# Patient Record
Sex: FEMALE | Race: WHITE | Employment: OTHER | URBAN - METROPOLITAN AREA
[De-identification: names, ages, dates, MRNs, and addresses within clinical notes are randomized per-mention and may not be internally consistent; named-entity substitution may affect disease eponyms.]

---

## 2017-07-11 PROBLEM — E11.8 CONTROLLED TYPE 2 DIABETES MELLITUS WITH COMPLICATION, WITHOUT LONG-TERM CURRENT USE OF INSULIN (HCC): Status: ACTIVE | Noted: 2017-07-11

## 2018-04-10 ENCOUNTER — NURSE ONLY (OUTPATIENT)
Dept: CARDIOLOGY CLINIC | Age: 68
End: 2018-04-10
Payer: MEDICARE

## 2018-04-10 ENCOUNTER — OFFICE VISIT (OUTPATIENT)
Dept: CARDIOLOGY CLINIC | Age: 68
End: 2018-04-10
Payer: MEDICARE

## 2018-04-10 VITALS
HEIGHT: 65 IN | WEIGHT: 181 LBS | SYSTOLIC BLOOD PRESSURE: 104 MMHG | HEART RATE: 92 BPM | DIASTOLIC BLOOD PRESSURE: 80 MMHG | BODY MASS INDEX: 30.16 KG/M2

## 2018-04-10 DIAGNOSIS — E11.8 CONTROLLED TYPE 2 DIABETES MELLITUS WITH COMPLICATION, WITHOUT LONG-TERM CURRENT USE OF INSULIN (HCC): ICD-10-CM

## 2018-04-10 DIAGNOSIS — Z95.0 STATUS POST PLACEMENT OF CARDIAC PACEMAKER: ICD-10-CM

## 2018-04-10 DIAGNOSIS — E78.5 DYSLIPIDEMIA: ICD-10-CM

## 2018-04-10 DIAGNOSIS — R00.1 BRADYCARDIA: Primary | ICD-10-CM

## 2018-04-10 DIAGNOSIS — Z95.0 STATUS POST PLACEMENT OF CARDIAC PACEMAKER: Primary | ICD-10-CM

## 2018-04-10 DIAGNOSIS — E66.9 NON MORBID OBESITY: ICD-10-CM

## 2018-04-10 PROCEDURE — G8427 DOCREV CUR MEDS BY ELIG CLIN: HCPCS | Performed by: INTERNAL MEDICINE

## 2018-04-10 PROCEDURE — 3017F COLORECTAL CA SCREEN DOC REV: CPT | Performed by: INTERNAL MEDICINE

## 2018-04-10 PROCEDURE — 93280 PM DEVICE PROGR EVAL DUAL: CPT | Performed by: INTERNAL MEDICINE

## 2018-04-10 PROCEDURE — 1090F PRES/ABSN URINE INCON ASSESS: CPT | Performed by: INTERNAL MEDICINE

## 2018-04-10 PROCEDURE — G8399 PT W/DXA RESULTS DOCUMENT: HCPCS | Performed by: INTERNAL MEDICINE

## 2018-04-10 PROCEDURE — 99214 OFFICE O/P EST MOD 30 MIN: CPT | Performed by: INTERNAL MEDICINE

## 2018-04-10 PROCEDURE — 1036F TOBACCO NON-USER: CPT | Performed by: INTERNAL MEDICINE

## 2018-04-10 PROCEDURE — 1123F ACP DISCUSS/DSCN MKR DOCD: CPT | Performed by: INTERNAL MEDICINE

## 2018-04-10 PROCEDURE — G8417 CALC BMI ABV UP PARAM F/U: HCPCS | Performed by: INTERNAL MEDICINE

## 2018-04-10 PROCEDURE — 4040F PNEUMOC VAC/ADMIN/RCVD: CPT | Performed by: INTERNAL MEDICINE

## 2018-04-10 PROCEDURE — 2022F DILAT RTA XM EVC RTNOPTHY: CPT | Performed by: INTERNAL MEDICINE

## 2018-04-10 PROCEDURE — 3046F HEMOGLOBIN A1C LEVEL >9.0%: CPT | Performed by: INTERNAL MEDICINE

## 2018-04-10 RX ORDER — PIOGLITAZONEHYDROCHLORIDE 15 MG/1
TABLET ORAL
Refills: 0 | COMMUNITY
Start: 2018-03-20 | End: 2018-04-10

## 2018-05-07 ENCOUNTER — HOSPITAL ENCOUNTER (OUTPATIENT)
Dept: MAMMOGRAPHY | Age: 68
Discharge: HOME | End: 2018-05-09
Payer: MEDICARE

## 2018-05-07 DIAGNOSIS — Z12.39 BREAST SCREENING: ICD-10-CM

## 2018-05-07 PROCEDURE — 77063 BREAST TOMOSYNTHESIS BI: CPT

## 2018-07-16 RX ORDER — SIMVASTATIN 10 MG
10 TABLET ORAL NIGHTLY
Qty: 90 TABLET | Refills: 3 | Status: SHIPPED | OUTPATIENT
Start: 2018-07-16 | End: 2018-07-18 | Stop reason: SDUPTHER

## 2018-07-16 RX ORDER — FUROSEMIDE 20 MG/1
20 TABLET ORAL DAILY
Qty: 90 TABLET | Refills: 3 | Status: SHIPPED | OUTPATIENT
Start: 2018-07-16 | End: 2018-07-18 | Stop reason: SDUPTHER

## 2018-07-18 RX ORDER — SIMVASTATIN 10 MG
10 TABLET ORAL NIGHTLY
Qty: 90 TABLET | Refills: 3 | Status: SHIPPED | OUTPATIENT
Start: 2018-07-18 | End: 2019-06-24 | Stop reason: SDUPTHER

## 2018-07-18 RX ORDER — FUROSEMIDE 20 MG/1
20 TABLET ORAL DAILY
Qty: 90 TABLET | Refills: 3 | Status: SHIPPED | OUTPATIENT
Start: 2018-07-18 | End: 2019-06-24 | Stop reason: SDUPTHER

## 2018-10-26 ENCOUNTER — NURSE ONLY (OUTPATIENT)
Dept: CARDIOLOGY CLINIC | Age: 68
End: 2018-10-26
Payer: MEDICARE

## 2018-10-26 ENCOUNTER — OFFICE VISIT (OUTPATIENT)
Dept: CARDIOLOGY CLINIC | Age: 68
End: 2018-10-26
Payer: MEDICARE

## 2018-10-26 VITALS
HEIGHT: 64 IN | DIASTOLIC BLOOD PRESSURE: 80 MMHG | BODY MASS INDEX: 31.07 KG/M2 | SYSTOLIC BLOOD PRESSURE: 110 MMHG | WEIGHT: 182 LBS | HEART RATE: 80 BPM

## 2018-10-26 DIAGNOSIS — E66.9 NON MORBID OBESITY: ICD-10-CM

## 2018-10-26 DIAGNOSIS — Z95.0 STATUS POST PLACEMENT OF CARDIAC PACEMAKER: ICD-10-CM

## 2018-10-26 DIAGNOSIS — Z95.0 PACEMAKER: Primary | ICD-10-CM

## 2018-10-26 DIAGNOSIS — E78.5 DYSLIPIDEMIA: ICD-10-CM

## 2018-10-26 DIAGNOSIS — E11.8 CONTROLLED TYPE 2 DIABETES MELLITUS WITH COMPLICATION, WITHOUT LONG-TERM CURRENT USE OF INSULIN (HCC): ICD-10-CM

## 2018-10-26 DIAGNOSIS — R00.1 BRADYCARDIA: Primary | ICD-10-CM

## 2018-10-26 PROCEDURE — 1101F PT FALLS ASSESS-DOCD LE1/YR: CPT | Performed by: INTERNAL MEDICINE

## 2018-10-26 PROCEDURE — G8399 PT W/DXA RESULTS DOCUMENT: HCPCS | Performed by: INTERNAL MEDICINE

## 2018-10-26 PROCEDURE — 3046F HEMOGLOBIN A1C LEVEL >9.0%: CPT | Performed by: INTERNAL MEDICINE

## 2018-10-26 PROCEDURE — 2022F DILAT RTA XM EVC RTNOPTHY: CPT | Performed by: INTERNAL MEDICINE

## 2018-10-26 PROCEDURE — 99214 OFFICE O/P EST MOD 30 MIN: CPT | Performed by: INTERNAL MEDICINE

## 2018-10-26 PROCEDURE — G8427 DOCREV CUR MEDS BY ELIG CLIN: HCPCS | Performed by: INTERNAL MEDICINE

## 2018-10-26 PROCEDURE — 1123F ACP DISCUSS/DSCN MKR DOCD: CPT | Performed by: INTERNAL MEDICINE

## 2018-10-26 PROCEDURE — 1090F PRES/ABSN URINE INCON ASSESS: CPT | Performed by: INTERNAL MEDICINE

## 2018-10-26 PROCEDURE — 4040F PNEUMOC VAC/ADMIN/RCVD: CPT | Performed by: INTERNAL MEDICINE

## 2018-10-26 PROCEDURE — 1036F TOBACCO NON-USER: CPT | Performed by: INTERNAL MEDICINE

## 2018-10-26 PROCEDURE — G8417 CALC BMI ABV UP PARAM F/U: HCPCS | Performed by: INTERNAL MEDICINE

## 2018-10-26 PROCEDURE — 93280 PM DEVICE PROGR EVAL DUAL: CPT | Performed by: INTERNAL MEDICINE

## 2018-10-26 PROCEDURE — G8484 FLU IMMUNIZE NO ADMIN: HCPCS | Performed by: INTERNAL MEDICINE

## 2018-10-26 PROCEDURE — 3017F COLORECTAL CA SCREEN DOC REV: CPT | Performed by: INTERNAL MEDICINE

## 2018-10-26 RX ORDER — PIOGLITAZONEHYDROCHLORIDE 15 MG/1
TABLET ORAL
Refills: 1 | COMMUNITY
Start: 2018-10-20 | End: 2021-11-09

## 2018-11-07 ENCOUNTER — HOSPITAL ENCOUNTER (OUTPATIENT)
Dept: CARDIAC CATH/INVASIVE PROCEDURES | Age: 68
Discharge: HOME | End: 2018-11-07
Payer: MEDICARE

## 2018-11-07 PROCEDURE — 76000 FLUOROSCOPY <1 HR PHYS/QHP: CPT | Performed by: INTERNAL MEDICINE

## 2019-01-22 ENCOUNTER — NURSE ONLY (OUTPATIENT)
Dept: CARDIOLOGY CLINIC | Age: 69
End: 2019-01-22
Payer: MEDICARE

## 2019-01-22 DIAGNOSIS — Z95.0 PACEMAKER: Primary | ICD-10-CM

## 2019-01-22 PROCEDURE — 93280 PM DEVICE PROGR EVAL DUAL: CPT | Performed by: INTERNAL MEDICINE

## 2019-04-05 ENCOUNTER — NURSE ONLY (OUTPATIENT)
Dept: CARDIOLOGY CLINIC | Age: 69
End: 2019-04-05
Payer: MEDICARE

## 2019-04-05 ENCOUNTER — OFFICE VISIT (OUTPATIENT)
Dept: CARDIOLOGY CLINIC | Age: 69
End: 2019-04-05
Payer: MEDICARE

## 2019-04-05 VITALS
SYSTOLIC BLOOD PRESSURE: 120 MMHG | DIASTOLIC BLOOD PRESSURE: 78 MMHG | HEIGHT: 64 IN | WEIGHT: 188 LBS | HEART RATE: 96 BPM | BODY MASS INDEX: 32.1 KG/M2

## 2019-04-05 DIAGNOSIS — Z90.12 S/P MASTECTOMY, LEFT: ICD-10-CM

## 2019-04-05 DIAGNOSIS — R00.1 BRADYCARDIA: Primary | ICD-10-CM

## 2019-04-05 DIAGNOSIS — E11.8 CONTROLLED TYPE 2 DIABETES MELLITUS WITH COMPLICATION, WITHOUT LONG-TERM CURRENT USE OF INSULIN (HCC): ICD-10-CM

## 2019-04-05 DIAGNOSIS — Z95.0 PACEMAKER: Primary | ICD-10-CM

## 2019-04-05 DIAGNOSIS — E66.9 NON MORBID OBESITY: ICD-10-CM

## 2019-04-05 DIAGNOSIS — Z95.0 STATUS POST PLACEMENT OF CARDIAC PACEMAKER: ICD-10-CM

## 2019-04-05 DIAGNOSIS — E78.5 DYSLIPIDEMIA: ICD-10-CM

## 2019-04-05 PROCEDURE — 93280 PM DEVICE PROGR EVAL DUAL: CPT | Performed by: INTERNAL MEDICINE

## 2019-04-05 PROCEDURE — 1123F ACP DISCUSS/DSCN MKR DOCD: CPT | Performed by: INTERNAL MEDICINE

## 2019-04-05 PROCEDURE — 93000 ELECTROCARDIOGRAM COMPLETE: CPT | Performed by: INTERNAL MEDICINE

## 2019-04-05 PROCEDURE — 1090F PRES/ABSN URINE INCON ASSESS: CPT | Performed by: INTERNAL MEDICINE

## 2019-04-05 PROCEDURE — G8427 DOCREV CUR MEDS BY ELIG CLIN: HCPCS | Performed by: INTERNAL MEDICINE

## 2019-04-05 PROCEDURE — G8417 CALC BMI ABV UP PARAM F/U: HCPCS | Performed by: INTERNAL MEDICINE

## 2019-04-05 PROCEDURE — 3046F HEMOGLOBIN A1C LEVEL >9.0%: CPT | Performed by: INTERNAL MEDICINE

## 2019-04-05 PROCEDURE — 3017F COLORECTAL CA SCREEN DOC REV: CPT | Performed by: INTERNAL MEDICINE

## 2019-04-05 PROCEDURE — 4040F PNEUMOC VAC/ADMIN/RCVD: CPT | Performed by: INTERNAL MEDICINE

## 2019-04-05 PROCEDURE — 2022F DILAT RTA XM EVC RTNOPTHY: CPT | Performed by: INTERNAL MEDICINE

## 2019-04-05 PROCEDURE — 99214 OFFICE O/P EST MOD 30 MIN: CPT | Performed by: INTERNAL MEDICINE

## 2019-04-05 PROCEDURE — G8399 PT W/DXA RESULTS DOCUMENT: HCPCS | Performed by: INTERNAL MEDICINE

## 2019-04-05 PROCEDURE — 1036F TOBACCO NON-USER: CPT | Performed by: INTERNAL MEDICINE

## 2019-04-05 NOTE — PROGRESS NOTES
 CARDIAC PACEMAKER PLACEMENT      battery depletion of permanent pacemaker with replacement    CARDIAC PACEMAKER PLACEMENT  2000    [facility] Pacesetter    CYST REMOVAL      cysts removed from hand, hip and foot    DIAGNOSTIC CARDIAC CATH LAB PROCEDURE  1990    [facility] normal coronaries, normal cath per PAW    DIAGNOSTIC CARDIAC CATH LAB PROCEDURE      mild dilated cardiomyopathy    FOOT SURGERY  2004    broken right foot with removal of hematoma by dr Kulwinder Solares, [facility]  2006    left mastectomy for cancer    TONSILLECTOMY          History reviewed. No pertinent family history. Social History     Tobacco Use    Smoking status: Former Smoker     Packs/day: 0.50     Years: 20.00     Pack years: 10.00     Last attempt to quit: 2001     Years since quittin.2    Smokeless tobacco: Never Used    Tobacco comment: quit in    Substance Use Topics    Alcohol use: No     Comment: daily pop         Review of Systems:  HEENT: negative for acute visual symptoms or auditory problems, no dysphagia  Constitutional: negative for fever and chills, or significant weight loss  Respiratory: negative for cough, wheezing, or hemoptysis  Cardiovascular: negative for chest pain, palpitations, and dyspnea  Gastrointestinal: negative for abdominal pain, diarrhea, nausea and vomiting  Endocrine: Negative for polyuria and polydyspsia  Genitourinary:negative for dysuria and hematuria  Derm: negative for rash and skin lesion(s)  Neurological: negative for tingling, numbness, weakness, seizures and tremors  Endocrine: negative for polydipsia and polyuria  Musculoskeletal: negative for pain or tenderness  Psychiatric: negative for anxiety, depression, or suicidal ideations         O:  COMPLETE PHYSICAL EXAM:       /78   Pulse 96   Ht 5' 4\" (1.626 m)   Wt 188 lb (85.3 kg)   BMI 32.27 kg/m²       General:   Patient alert, comfortable, no distress.   Appears stated profile discussed. Call if any exertional chest pain, short of breath, dizzy or palpitations   Follow up in 6 months or earlier if needed.          [facility] Cardiology  [address], [place], [address] [facility]  [phone]

## 2019-05-16 ENCOUNTER — HOSPITAL ENCOUNTER (OUTPATIENT)
Dept: MAMMOGRAPHY | Age: 69
Discharge: HOME | End: 2019-05-18
Payer: MEDICARE

## 2019-05-16 DIAGNOSIS — Z12.39 BREAST SCREENING: ICD-10-CM

## 2019-05-16 PROCEDURE — 77067 SCR MAMMO BI INCL CAD: CPT

## 2019-05-24 ENCOUNTER — HOSPITAL ENCOUNTER (OUTPATIENT)
Dept: ULTRASOUND IMAGING | Age: 69
End: 2019-05-24
Payer: MEDICARE

## 2019-05-24 ENCOUNTER — HOSPITAL ENCOUNTER (OUTPATIENT)
Dept: MAMMOGRAPHY | Age: 69
Discharge: HOME | End: 2019-05-26
Payer: MEDICARE

## 2019-05-24 DIAGNOSIS — R92.0 BREAST MICROCALCIFICATIONS: ICD-10-CM

## 2019-05-24 PROCEDURE — 77065 DX MAMMO INCL CAD UNI: CPT

## 2019-06-24 RX ORDER — FUROSEMIDE 20 MG/1
20 TABLET ORAL DAILY
Qty: 90 TABLET | Refills: 3 | Status: SHIPPED | OUTPATIENT
Start: 2019-06-24 | End: 2019-06-27 | Stop reason: SDUPTHER

## 2019-06-24 RX ORDER — SIMVASTATIN 10 MG
10 TABLET ORAL NIGHTLY
Qty: 90 TABLET | Refills: 3 | Status: SHIPPED | OUTPATIENT
Start: 2019-06-24 | End: 2019-06-27 | Stop reason: SDUPTHER

## 2019-06-27 RX ORDER — FUROSEMIDE 20 MG/1
20 TABLET ORAL DAILY
Qty: 90 TABLET | Refills: 3 | Status: SHIPPED
Start: 2019-06-27 | End: 2020-06-15

## 2019-06-27 RX ORDER — SIMVASTATIN 10 MG
10 TABLET ORAL NIGHTLY
Qty: 90 TABLET | Refills: 3 | Status: SHIPPED
Start: 2019-06-27 | End: 2020-06-15

## 2019-11-12 ENCOUNTER — OFFICE VISIT (OUTPATIENT)
Dept: CARDIOLOGY CLINIC | Age: 69
End: 2019-11-12
Payer: MEDICARE

## 2019-11-12 VITALS
HEART RATE: 91 BPM | BODY MASS INDEX: 31.58 KG/M2 | DIASTOLIC BLOOD PRESSURE: 72 MMHG | SYSTOLIC BLOOD PRESSURE: 128 MMHG | HEIGHT: 64 IN | WEIGHT: 185 LBS

## 2019-11-12 DIAGNOSIS — Z95.0 STATUS POST PLACEMENT OF CARDIAC PACEMAKER: ICD-10-CM

## 2019-11-12 DIAGNOSIS — E66.9 NON MORBID OBESITY: ICD-10-CM

## 2019-11-12 DIAGNOSIS — E78.5 DYSLIPIDEMIA: ICD-10-CM

## 2019-11-12 DIAGNOSIS — E11.8 CONTROLLED TYPE 2 DIABETES MELLITUS WITH COMPLICATION, WITHOUT LONG-TERM CURRENT USE OF INSULIN (HCC): ICD-10-CM

## 2019-11-12 DIAGNOSIS — R00.1 BRADYCARDIA: Primary | ICD-10-CM

## 2019-11-12 DIAGNOSIS — Z90.12 S/P MASTECTOMY, LEFT: ICD-10-CM

## 2019-11-12 PROCEDURE — 1036F TOBACCO NON-USER: CPT | Performed by: INTERNAL MEDICINE

## 2019-11-12 PROCEDURE — 1123F ACP DISCUSS/DSCN MKR DOCD: CPT | Performed by: INTERNAL MEDICINE

## 2019-11-12 PROCEDURE — 3046F HEMOGLOBIN A1C LEVEL >9.0%: CPT | Performed by: INTERNAL MEDICINE

## 2019-11-12 PROCEDURE — G8484 FLU IMMUNIZE NO ADMIN: HCPCS | Performed by: INTERNAL MEDICINE

## 2019-11-12 PROCEDURE — G8417 CALC BMI ABV UP PARAM F/U: HCPCS | Performed by: INTERNAL MEDICINE

## 2019-11-12 PROCEDURE — G8427 DOCREV CUR MEDS BY ELIG CLIN: HCPCS | Performed by: INTERNAL MEDICINE

## 2019-11-12 PROCEDURE — 3017F COLORECTAL CA SCREEN DOC REV: CPT | Performed by: INTERNAL MEDICINE

## 2019-11-12 PROCEDURE — G8399 PT W/DXA RESULTS DOCUMENT: HCPCS | Performed by: INTERNAL MEDICINE

## 2019-11-12 PROCEDURE — 4040F PNEUMOC VAC/ADMIN/RCVD: CPT | Performed by: INTERNAL MEDICINE

## 2019-11-12 PROCEDURE — 99214 OFFICE O/P EST MOD 30 MIN: CPT | Performed by: INTERNAL MEDICINE

## 2019-11-12 PROCEDURE — 1090F PRES/ABSN URINE INCON ASSESS: CPT | Performed by: INTERNAL MEDICINE

## 2019-11-12 PROCEDURE — 2022F DILAT RTA XM EVC RTNOPTHY: CPT | Performed by: INTERNAL MEDICINE

## 2019-11-15 ENCOUNTER — HOSPITAL ENCOUNTER (OUTPATIENT)
Dept: ULTRASOUND IMAGING | Age: 69
End: 2019-11-15
Payer: MEDICARE

## 2019-11-15 ENCOUNTER — HOSPITAL ENCOUNTER (OUTPATIENT)
Dept: MAMMOGRAPHY | Age: 69
Discharge: HOME | End: 2019-11-17
Payer: MEDICARE

## 2019-11-15 DIAGNOSIS — Z09 FOLLOW-UP EXAM, 3-6 MONTHS SINCE PREVIOUS EXAM: ICD-10-CM

## 2019-11-15 PROCEDURE — G0279 TOMOSYNTHESIS, MAMMO: HCPCS

## 2020-02-14 ENCOUNTER — TELEPHONE (OUTPATIENT)
Dept: CARDIOLOGY CLINIC | Age: 70
End: 2020-02-14

## 2020-02-14 NOTE — TELEPHONE ENCOUNTER
Called patient to schedule J-Wire fluroscopy. Her pacemaker lead. Scheduled fluoroscopy in [facility] 2/28/2020 @ 11:30. Madeline Dias     Discussed with patient  She agreed to date and time    I can't find the order for the procedure. Called [facility], spoke to Joaquín Dave and Ramos Tinajero in [facility].   I was not able to find the order

## 2020-02-18 ENCOUNTER — TELEPHONE (OUTPATIENT)
Dept: CARDIOLOGY CLINIC | Age: 70
End: 2020-02-18

## 2020-02-18 NOTE — TELEPHONE ENCOUNTER
Jorden Her the [facility]  asking me who would do the fluoroscopy of the J-Wire Pacemaker lead. Would one of the EP physicians do this procedure. I've been told  It takes 5 minutes. In the past Dr Sravani Horton did this,  but since he is no longer in [facility] was not sure if EP would since its a pacemaker lead.      Yun Pendleton

## 2020-02-24 ENCOUNTER — TELEPHONE (OUTPATIENT)
Dept: NON INVASIVE DIAGNOSTICS | Age: 70
End: 2020-02-24

## 2020-02-28 ENCOUNTER — HOSPITAL ENCOUNTER (OUTPATIENT)
Dept: CARDIAC CATH/INVASIVE PROCEDURES | Age: 70
Discharge: HOME | End: 2020-02-28
Payer: MEDICARE

## 2020-02-28 PROCEDURE — 76000 FLUOROSCOPY <1 HR PHYS/QHP: CPT

## 2020-02-28 NOTE — H&P
 APPENDECTOMY      BREAST BIOPSY Right 2019    BREAST LUMPECTOMY      3 benign lumps removed from left breast    CARDIAC PACEMAKER PLACEMENT      dr Torres [place] for sick sinus syndrome sinus arrest    CARDIAC PACEMAKER PLACEMENT      battery depletion of permanent pacemaker with replacement    CARDIAC PACEMAKER PLACEMENT      [facility] Pacesetter    CYST REMOVAL      cysts removed from hand, hip and foot    DIAGNOSTIC CARDIAC CATH LAB PROCEDURE  1990    [facility] normal coronaries, normal cath per PAW    DIAGNOSTIC CARDIAC CATH LAB PROCEDURE      mild dilated cardiomyopathy    EYE SURGERY Bilateral 2019    laser Sx (relieve pressure)    FOOT SURGERY  2004    broken right foot with removal of hematoma by dr Evelin Barnett, RADICAL  2006    left mastectomy for cancer    TONSILLECTOMY          No family history on file.        Social History     Tobacco Use    Smoking status: Former Smoker     Packs/day: 0.50     Years: 20.00     Pack years: 10.00     Last attempt to quit: 2001     Years since quittin.1    Smokeless tobacco: Never Used    Tobacco comment: quit in    Substance Use Topics    Alcohol use: No     Comment: daily pop         Review of Systems:  HEENT: negative for acute visual symptoms or auditory problems, no dysphagia  Constitutional: negative for fever and chills, or significant weight loss  Respiratory: negative for cough, wheezing, or hemoptysis  Cardiovascular: negative for chest pain, palpitations, and dyspnea  Gastrointestinal: negative for abdominal pain, diarrhea, nausea and vomiting  Endocrine: Negative for polyuria and polydyspsia  Genitourinary:negative for dysuria and hematuria  Derm: negative for rash and skin lesion(s)  Neurological: negative for tingling, numbness, weakness, seizures and tremors  Endocrine: negative for polydipsia and polyuria  Musculoskeletal: negative for pain or tenderness  Psychiatric: negative

## 2020-03-17 NOTE — OP NOTE
[facility] Cardiac Electrophysiology    Annual fluoroscopy of the patient's pacemaker leads were performed. The atrial and ventricular lead status remains stable.     Clayton Palmer DO  [facility] [place] [facility] Cardiac Electrophysiology  [facility]

## 2020-06-15 RX ORDER — SIMVASTATIN 10 MG
TABLET ORAL
Qty: 90 TABLET | Refills: 3 | Status: SHIPPED
Start: 2020-06-15 | End: 2021-06-01

## 2020-06-15 RX ORDER — FUROSEMIDE 20 MG/1
TABLET ORAL
Qty: 90 TABLET | Refills: 3 | Status: SHIPPED
Start: 2020-06-15 | End: 2021-06-01

## 2020-06-30 ENCOUNTER — NURSE ONLY (OUTPATIENT)
Dept: CARDIOLOGY CLINIC | Age: 70
End: 2020-06-30
Payer: MEDICARE

## 2020-06-30 ENCOUNTER — OFFICE VISIT (OUTPATIENT)
Dept: CARDIOLOGY CLINIC | Age: 70
End: 2020-06-30
Payer: MEDICARE

## 2020-06-30 VITALS
HEIGHT: 64 IN | SYSTOLIC BLOOD PRESSURE: 128 MMHG | BODY MASS INDEX: 33.29 KG/M2 | DIASTOLIC BLOOD PRESSURE: 76 MMHG | WEIGHT: 195 LBS | HEART RATE: 98 BPM

## 2020-06-30 PROCEDURE — 2022F DILAT RTA XM EVC RTNOPTHY: CPT | Performed by: INTERNAL MEDICINE

## 2020-06-30 PROCEDURE — 1123F ACP DISCUSS/DSCN MKR DOCD: CPT | Performed by: INTERNAL MEDICINE

## 2020-06-30 PROCEDURE — 1036F TOBACCO NON-USER: CPT | Performed by: INTERNAL MEDICINE

## 2020-06-30 PROCEDURE — G8427 DOCREV CUR MEDS BY ELIG CLIN: HCPCS | Performed by: INTERNAL MEDICINE

## 2020-06-30 PROCEDURE — G8417 CALC BMI ABV UP PARAM F/U: HCPCS | Performed by: INTERNAL MEDICINE

## 2020-06-30 PROCEDURE — 99214 OFFICE O/P EST MOD 30 MIN: CPT | Performed by: INTERNAL MEDICINE

## 2020-06-30 PROCEDURE — 93280 PM DEVICE PROGR EVAL DUAL: CPT | Performed by: INTERNAL MEDICINE

## 2020-06-30 PROCEDURE — 3046F HEMOGLOBIN A1C LEVEL >9.0%: CPT | Performed by: INTERNAL MEDICINE

## 2020-06-30 PROCEDURE — G8399 PT W/DXA RESULTS DOCUMENT: HCPCS | Performed by: INTERNAL MEDICINE

## 2020-06-30 PROCEDURE — 4040F PNEUMOC VAC/ADMIN/RCVD: CPT | Performed by: INTERNAL MEDICINE

## 2020-06-30 PROCEDURE — 3017F COLORECTAL CA SCREEN DOC REV: CPT | Performed by: INTERNAL MEDICINE

## 2020-06-30 PROCEDURE — 1090F PRES/ABSN URINE INCON ASSESS: CPT | Performed by: INTERNAL MEDICINE

## 2020-09-01 ENCOUNTER — TELEPHONE (OUTPATIENT)
Dept: NON INVASIVE DIAGNOSTICS | Age: 70
End: 2020-09-01

## 2020-09-01 ENCOUNTER — NURSE ONLY (OUTPATIENT)
Dept: CARDIOLOGY CLINIC | Age: 70
End: 2020-09-01

## 2020-09-01 NOTE — TELEPHONE ENCOUNTER
----- Message from Garry Nash MD sent at 9/1/2020 11:48 AM EDT -----  Could you please check with [employer] and see whether we need an extraction of the atrial lead or we can just abandon it. Do we still have to do fluoroscopy every year even though it is abandoned? Thanks.   ----- Message -----  From: Barry Jones RN  Sent: 9/1/2020  10:58 AM EDT  To: Garry Nash MD, Chelsey Bustamante    Hi,   Please review when you have a moment. Dr Brando Sun requesting PGR with new atrial lead within the month. Device triggered FIFI.  KNOWN High Atrial lead impedence with multiple noise reversions; Per Dr Cheryle Salines note New Atrial lead at the time of PGR or sooner; Pt aware; Last annual fluoroscopy of atrial Encore lead with retained stylet per  advisory done 02/28/200 by Dr Yadira Epstein. Last 2D Echo 02/24/12 EF 64%. Not Dependent.   Thanks,   Fahad Tamez

## 2020-09-02 NOTE — TELEPHONE ENCOUNTER
2D Echo scheduled for 09/15/2020 @ 10am at the [facility] office  [address] that someone would be calling her from our [address] to schedule Venessa abad with new Atrial Lead after the echo is complete

## 2020-09-02 NOTE — TELEPHONE ENCOUNTER
I spoke to [facility] and scheduled her gen change w/ new atrial lead on 9/29/20 w/ CK. He will obtain COVID testing at [address] [place] [address] on 9/24/20.

## 2020-09-11 ENCOUNTER — TELEPHONE (OUTPATIENT)
Dept: CARDIOLOGY | Age: 70
End: 2020-09-11

## 2020-09-15 ENCOUNTER — HOSPITAL ENCOUNTER (OUTPATIENT)
Dept: CARDIOLOGY | Age: 70
Discharge: HOME | End: 2020-09-15
Payer: MEDICARE

## 2020-09-15 LAB
LV EF: 58 %
LVEF MODALITY: NORMAL

## 2020-09-15 PROCEDURE — 93306 TTE W/DOPPLER COMPLETE: CPT

## 2020-09-16 ENCOUNTER — TELEPHONE (OUTPATIENT)
Dept: NON INVASIVE DIAGNOSTICS | Age: 70
End: 2020-09-16

## 2020-09-16 NOTE — TELEPHONE ENCOUNTER
----- Message from Wilfrido Caldwell MD sent at 9/15/2020  8:04 PM EDT -----  Please let her know that echo showed normal LV EF.  Thanks.  ----- Message -----  From: Denae Gross Cardiology Results From [facility]  Sent: 9/15/2020   4:59 PM EDT  To: Wilfrido Caldwell MD

## 2020-09-24 ENCOUNTER — HOSPITAL ENCOUNTER (OUTPATIENT)
Age: 70
Discharge: HOME | End: 2020-09-26
Payer: MEDICARE

## 2020-09-24 PROCEDURE — U0003 INFECTIOUS AGENT DETECTION BY NUCLEIC ACID (DNA OR RNA); SEVERE ACUTE RESPIRATORY SYNDROME CORONAVIRUS 2 (SARS-COV-2) (CORONAVIRUS DISEASE [COVID-19]), AMPLIFIED PROBE TECHNIQUE, MAKING USE OF HIGH THROUGHPUT TECHNOLOGIES AS DESCRIBED BY CMS-2020-01-R: HCPCS

## 2020-09-26 LAB
SARS-COV-2: NOT DETECTED
SOURCE: NORMAL

## 2020-09-29 ENCOUNTER — ANESTHESIA (OUTPATIENT)
Dept: CARDIAC CATH/INVASIVE PROCEDURES | Age: 70
End: 2020-09-29

## 2020-09-29 ENCOUNTER — HOSPITAL ENCOUNTER (OUTPATIENT)
Dept: GENERAL RADIOLOGY | Age: 70
Discharge: HOME | DRG: 242 | End: 2020-10-01
Attending: INTERNAL MEDICINE
Payer: MEDICARE

## 2020-09-29 ENCOUNTER — ANESTHESIA EVENT (OUTPATIENT)
Dept: CARDIAC CATH/INVASIVE PROCEDURES | Age: 70
End: 2020-09-29

## 2020-09-29 ENCOUNTER — HOSPITAL ENCOUNTER (INPATIENT)
Dept: CARDIAC CATH/INVASIVE PROCEDURES | Age: 70
LOS: 5 days | Discharge: HOME | DRG: 242 | End: 2020-10-06
Attending: INTERNAL MEDICINE | Admitting: INTERNAL MEDICINE
Payer: MEDICARE

## 2020-09-29 VITALS — OXYGEN SATURATION: 98 % | DIASTOLIC BLOOD PRESSURE: 78 MMHG | SYSTOLIC BLOOD PRESSURE: 93 MMHG

## 2020-09-29 PROBLEM — Z95.0 S/P PLACEMENT OF CARDIAC PACEMAKER: Status: ACTIVE | Noted: 2020-09-29

## 2020-09-29 LAB
ANION GAP SERPL CALCULATED.3IONS-SCNC: 16 MMOL/L (ref 7–16)
BASOPHILS ABSOLUTE: 0.05 E9/L (ref 0–0.2)
BASOPHILS RELATIVE PERCENT: 0.7 % (ref 0–2)
BUN BLDV-MCNC: 11 MG/DL (ref 8–23)
CALCIUM SERPL-MCNC: 9.2 MG/DL (ref 8.6–10.2)
CHLORIDE BLD-SCNC: 102 MMOL/L (ref 98–107)
CO2: 25 MMOL/L (ref 22–29)
CREAT SERPL-MCNC: 0.7 MG/DL (ref 0.5–1)
EKG ATRIAL RATE: 78 BPM
EKG P AXIS: 25 DEGREES
EKG P-R INTERVAL: 188 MS
EKG Q-T INTERVAL: 398 MS
EKG QRS DURATION: 94 MS
EKG QTC CALCULATION (BAZETT): 453 MS
EKG R AXIS: -6 DEGREES
EKG T AXIS: -4 DEGREES
EKG VENTRICULAR RATE: 78 BPM
EOSINOPHILS ABSOLUTE: 0.21 E9/L (ref 0.05–0.5)
EOSINOPHILS RELATIVE PERCENT: 2.8 % (ref 0–6)
GFR AFRICAN AMERICAN: >60
GFR NON-AFRICAN AMERICAN: >60 ML/MIN/1.73
GLUCOSE BLD-MCNC: 111 MG/DL (ref 74–99)
HCT VFR BLD CALC: 44.3 % (ref 34–48)
HEMOGLOBIN: 15 G/DL (ref 11.5–15.5)
IMMATURE GRANULOCYTES #: 0.02 E9/L
IMMATURE GRANULOCYTES %: 0.3 % (ref 0–5)
LYMPHOCYTES ABSOLUTE: 2 E9/L (ref 1.5–4)
LYMPHOCYTES RELATIVE PERCENT: 26.6 % (ref 20–42)
MAGNESIUM: 2 MG/DL (ref 1.6–2.6)
MCH RBC QN AUTO: 30.4 PG (ref 26–35)
MCHC RBC AUTO-ENTMCNC: 33.9 % (ref 32–34.5)
MCV RBC AUTO: 89.7 FL (ref 80–99.9)
MONOCYTES ABSOLUTE: 0.76 E9/L (ref 0.1–0.95)
MONOCYTES RELATIVE PERCENT: 10.1 % (ref 2–12)
NEUTROPHILS ABSOLUTE: 4.47 E9/L (ref 1.8–7.3)
NEUTROPHILS RELATIVE PERCENT: 59.5 % (ref 43–80)
PDW BLD-RTO: 12.6 FL (ref 11.5–15)
PLATELET # BLD: 299 E9/L (ref 130–450)
PMV BLD AUTO: 9.6 FL (ref 7–12)
POTASSIUM SERPL-SCNC: 3.7 MMOL/L (ref 3.5–5)
RBC # BLD: 4.94 E12/L (ref 3.5–5.5)
SODIUM BLD-SCNC: 143 MMOL/L (ref 132–146)
WBC # BLD: 7.5 E9/L (ref 4.5–11.5)

## 2020-09-29 PROCEDURE — 33233 REMOVAL OF PM GENERATOR: CPT | Performed by: INTERNAL MEDICINE

## 2020-09-29 PROCEDURE — 6370000000 HC RX 637 (ALT 250 FOR IP): Performed by: INTERNAL MEDICINE

## 2020-09-29 PROCEDURE — 80048 BASIC METABOLIC PNL TOTAL CA: CPT

## 2020-09-29 PROCEDURE — G0378 HOSPITAL OBSERVATION PER HR: HCPCS

## 2020-09-29 PROCEDURE — G0379 DIRECT REFER HOSPITAL OBSERV: HCPCS

## 2020-09-29 PROCEDURE — 33206 INSERT HEART PM ATRIAL: CPT | Performed by: INTERNAL MEDICINE

## 2020-09-29 PROCEDURE — 93010 ELECTROCARDIOGRAM REPORT: CPT | Performed by: INTERNAL MEDICINE

## 2020-09-29 PROCEDURE — 2580000003 HC RX 258: Performed by: INTERNAL MEDICINE

## 2020-09-29 PROCEDURE — 99223 1ST HOSP IP/OBS HIGH 75: CPT | Performed by: INTERNAL MEDICINE

## 2020-09-29 PROCEDURE — 2709999900 HC NON-CHARGEABLE SUPPLY

## 2020-09-29 PROCEDURE — 3E0102A INTRODUCTION OF ANTI-INFECTIVE ENVELOPE INTO SUBCUTANEOUS TISSUE, OPEN APPROACH: ICD-10-PCS | Performed by: INTERNAL MEDICINE

## 2020-09-29 PROCEDURE — 0JH606Z INSERTION OF PACEMAKER, DUAL CHAMBER INTO CHEST SUBCUTANEOUS TISSUE AND FASCIA, OPEN APPROACH: ICD-10-PCS | Performed by: INTERNAL MEDICINE

## 2020-09-29 PROCEDURE — 2580000003 HC RX 258

## 2020-09-29 PROCEDURE — 36415 COLL VENOUS BLD VENIPUNCTURE: CPT

## 2020-09-29 PROCEDURE — 3700000000 HC ANESTHESIA ATTENDED CARE

## 2020-09-29 PROCEDURE — C1894 INTRO/SHEATH, NON-LASER: HCPCS

## 2020-09-29 PROCEDURE — 2780000010 HC IMPLANT OTHER

## 2020-09-29 PROCEDURE — 6360000002 HC RX W HCPCS: Performed by: NURSE ANESTHETIST, CERTIFIED REGISTERED

## 2020-09-29 PROCEDURE — C1769 GUIDE WIRE: HCPCS

## 2020-09-29 PROCEDURE — 93005 ELECTROCARDIOGRAM TRACING: CPT | Performed by: INTERNAL MEDICINE

## 2020-09-29 PROCEDURE — 6360000002 HC RX W HCPCS

## 2020-09-29 PROCEDURE — 02H63JZ INSERTION OF PACEMAKER LEAD INTO RIGHT ATRIUM, PERCUTANEOUS APPROACH: ICD-10-PCS | Performed by: INTERNAL MEDICINE

## 2020-09-29 PROCEDURE — 02PA3MZ REMOVAL OF CARDIAC LEAD FROM HEART, PERCUTANEOUS APPROACH: ICD-10-PCS | Performed by: INTERNAL MEDICINE

## 2020-09-29 PROCEDURE — 83735 ASSAY OF MAGNESIUM: CPT

## 2020-09-29 PROCEDURE — 0JPT0PZ REMOVAL OF CARDIAC RHYTHM RELATED DEVICE FROM TRUNK SUBCUTANEOUS TISSUE AND FASCIA, OPEN APPROACH: ICD-10-PCS | Performed by: INTERNAL MEDICINE

## 2020-09-29 PROCEDURE — 85025 COMPLETE CBC W/AUTO DIFF WBC: CPT

## 2020-09-29 PROCEDURE — 71045 X-RAY EXAM CHEST 1 VIEW: CPT

## 2020-09-29 PROCEDURE — C1898 LEAD, PMKR, OTHER THAN TRANS: HCPCS

## 2020-09-29 PROCEDURE — 2720000010 HC SURG SUPPLY STERILE

## 2020-09-29 PROCEDURE — 3700000001 HC ADD 15 MINUTES (ANESTHESIA)

## 2020-09-29 PROCEDURE — 2500000003 HC RX 250 WO HCPCS

## 2020-09-29 PROCEDURE — C1785 PMKR, DUAL, RATE-RESP: HCPCS

## 2020-09-29 RX ORDER — FENTANYL CITRATE 50 UG/ML
INJECTION, SOLUTION INTRAMUSCULAR; INTRAVENOUS PRN
Status: DISCONTINUED | OUTPATIENT
Start: 2020-09-29 | End: 2020-09-29 | Stop reason: SDUPTHER

## 2020-09-29 RX ORDER — PROPOFOL 10 MG/ML
INJECTION, EMULSION INTRAVENOUS PRN
Status: DISCONTINUED | OUTPATIENT
Start: 2020-09-29 | End: 2020-09-29 | Stop reason: SDUPTHER

## 2020-09-29 RX ORDER — SODIUM CHLORIDE 0.9 % (FLUSH) 0.9 %
10 SYRINGE (ML) INJECTION EVERY 12 HOURS SCHEDULED
Status: DISCONTINUED | OUTPATIENT
Start: 2020-09-29 | End: 2020-10-06 | Stop reason: HOSPADM

## 2020-09-29 RX ORDER — M-VIT,TX,IRON,MINS/CALC/FOLIC 27MG-0.4MG
1 TABLET ORAL DAILY
Status: DISCONTINUED | OUTPATIENT
Start: 2020-09-29 | End: 2020-10-06 | Stop reason: HOSPADM

## 2020-09-29 RX ORDER — PIOGLITAZONEHYDROCHLORIDE 15 MG/1
15 TABLET ORAL DAILY
Status: DISCONTINUED | OUTPATIENT
Start: 2020-09-29 | End: 2020-10-06 | Stop reason: HOSPADM

## 2020-09-29 RX ORDER — PROPOFOL 10 MG/ML
INJECTION, EMULSION INTRAVENOUS CONTINUOUS PRN
Status: DISCONTINUED | OUTPATIENT
Start: 2020-09-29 | End: 2020-09-29 | Stop reason: SDUPTHER

## 2020-09-29 RX ORDER — SIMVASTATIN 10 MG
10 TABLET ORAL NIGHTLY
Status: DISCONTINUED | OUTPATIENT
Start: 2020-09-29 | End: 2020-10-06 | Stop reason: HOSPADM

## 2020-09-29 RX ORDER — FENTANYL CITRATE 50 UG/ML
25 INJECTION, SOLUTION INTRAMUSCULAR; INTRAVENOUS
Status: DISCONTINUED | OUTPATIENT
Start: 2020-09-29 | End: 2020-09-30

## 2020-09-29 RX ORDER — ASPIRIN 81 MG/1
81 TABLET, CHEWABLE ORAL DAILY
Status: DISCONTINUED | OUTPATIENT
Start: 2020-09-29 | End: 2020-10-06 | Stop reason: HOSPADM

## 2020-09-29 RX ORDER — LORATADINE 10 MG/1
10 TABLET ORAL DAILY
Status: DISCONTINUED | OUTPATIENT
Start: 2020-09-29 | End: 2020-10-06 | Stop reason: HOSPADM

## 2020-09-29 RX ORDER — SODIUM CHLORIDE 0.9 % (FLUSH) 0.9 %
10 SYRINGE (ML) INJECTION PRN
Status: DISCONTINUED | OUTPATIENT
Start: 2020-09-29 | End: 2020-10-06 | Stop reason: HOSPADM

## 2020-09-29 RX ORDER — ACETAMINOPHEN 325 MG/1
650 TABLET ORAL EVERY 4 HOURS PRN
Status: DISCONTINUED | OUTPATIENT
Start: 2020-09-29 | End: 2020-10-06 | Stop reason: HOSPADM

## 2020-09-29 RX ORDER — MIDAZOLAM HYDROCHLORIDE 1 MG/ML
INJECTION INTRAMUSCULAR; INTRAVENOUS PRN
Status: DISCONTINUED | OUTPATIENT
Start: 2020-09-29 | End: 2020-09-29 | Stop reason: SDUPTHER

## 2020-09-29 RX ORDER — CEFAZOLIN SODIUM 1 G/3ML
INJECTION, POWDER, FOR SOLUTION INTRAMUSCULAR; INTRAVENOUS PRN
Status: DISCONTINUED | OUTPATIENT
Start: 2020-09-29 | End: 2020-09-29 | Stop reason: SDUPTHER

## 2020-09-29 RX ORDER — SODIUM CHLORIDE 9 MG/ML
INJECTION, SOLUTION INTRAVENOUS CONTINUOUS
Status: DISCONTINUED | OUTPATIENT
Start: 2020-09-29 | End: 2020-10-04

## 2020-09-29 RX ORDER — FUROSEMIDE 20 MG/1
20 TABLET ORAL DAILY
Status: DISCONTINUED | OUTPATIENT
Start: 2020-09-29 | End: 2020-10-06 | Stop reason: HOSPADM

## 2020-09-29 RX ORDER — LORAZEPAM 1 MG/1
1 TABLET ORAL 3 TIMES DAILY
Status: DISCONTINUED | OUTPATIENT
Start: 2020-09-29 | End: 2020-10-06 | Stop reason: HOSPADM

## 2020-09-29 RX ADMIN — Medication 10 ML: at 22:41

## 2020-09-29 RX ADMIN — LORAZEPAM 1 MG: 1 TABLET ORAL at 22:41

## 2020-09-29 RX ADMIN — SODIUM CHLORIDE: 9 INJECTION, SOLUTION INTRAVENOUS at 11:05

## 2020-09-29 RX ADMIN — PROPOFOL 50 MG: 10 INJECTION, EMULSION INTRAVENOUS at 11:36

## 2020-09-29 RX ADMIN — SIMVASTATIN 10 MG: 10 TABLET, FILM COATED ORAL at 22:41

## 2020-09-29 RX ADMIN — MULTIPLE VITAMINS W/ MINERALS TAB 1 TABLET: TAB at 15:20

## 2020-09-29 RX ADMIN — LORATADINE 10 MG: 10 TABLET ORAL at 15:20

## 2020-09-29 RX ADMIN — SODIUM CHLORIDE: 9 INJECTION, SOLUTION INTRAVENOUS at 08:08

## 2020-09-29 RX ADMIN — CEFAZOLIN 2000 MG: 1 INJECTION, POWDER, FOR SOLUTION INTRAMUSCULAR; INTRAVENOUS at 11:29

## 2020-09-29 RX ADMIN — ASPIRIN 81 MG CHEWABLE TABLET 81 MG: 81 TABLET CHEWABLE at 15:20

## 2020-09-29 RX ADMIN — FENTANYL CITRATE 50 MCG: 50 INJECTION, SOLUTION INTRAMUSCULAR; INTRAVENOUS at 11:25

## 2020-09-29 RX ADMIN — PIOGLITAZONE 15 MG: 15 TABLET ORAL at 15:20

## 2020-09-29 RX ADMIN — FUROSEMIDE 20 MG: 20 TABLET ORAL at 15:20

## 2020-09-29 RX ADMIN — FENTANYL CITRATE 50 MCG: 50 INJECTION, SOLUTION INTRAMUSCULAR; INTRAVENOUS at 11:20

## 2020-09-29 RX ADMIN — MIDAZOLAM 2 MG: 1 INJECTION INTRAMUSCULAR; INTRAVENOUS at 11:20

## 2020-09-29 RX ADMIN — PROPOFOL 30 MCG/KG/MIN: 10 INJECTION, EMULSION INTRAVENOUS at 11:28

## 2020-09-29 RX ADMIN — LORAZEPAM 1 MG: 1 TABLET ORAL at 15:20

## 2020-09-29 RX ADMIN — ACETAMINOPHEN 650 MG: 325 TABLET, FILM COATED ORAL at 23:54

## 2020-09-29 RX ADMIN — PROPOFOL 25 MG: 10 INJECTION, EMULSION INTRAVENOUS at 11:32

## 2020-09-29 ASSESSMENT — PULMONARY FUNCTION TESTS
PIF_VALUE: 13
PIF_VALUE: 12
PIF_VALUE: 12
PIF_VALUE: 11
PIF_VALUE: 13
PIF_VALUE: 13
PIF_VALUE: 12
PIF_VALUE: 13
PIF_VALUE: 1
PIF_VALUE: 13
PIF_VALUE: 12
PIF_VALUE: 11
PIF_VALUE: 11
PIF_VALUE: 18
PIF_VALUE: 12
PIF_VALUE: 20
PIF_VALUE: 14
PIF_VALUE: 11
PIF_VALUE: 12
PIF_VALUE: 11
PIF_VALUE: 1
PIF_VALUE: 11
PIF_VALUE: 12
PIF_VALUE: 1
PIF_VALUE: 12
PIF_VALUE: 12
PIF_VALUE: 11
PIF_VALUE: 1
PIF_VALUE: 12
PIF_VALUE: 13
PIF_VALUE: 18
PIF_VALUE: 11
PIF_VALUE: 14
PIF_VALUE: 12
PIF_VALUE: 13
PIF_VALUE: 1
PIF_VALUE: 20
PIF_VALUE: 11
PIF_VALUE: 12
PIF_VALUE: 1
PIF_VALUE: 12
PIF_VALUE: 13
PIF_VALUE: 12
PIF_VALUE: 11
PIF_VALUE: 13
PIF_VALUE: 13
PIF_VALUE: 1
PIF_VALUE: 12
PIF_VALUE: 12
PIF_VALUE: 1
PIF_VALUE: 1
PIF_VALUE: 12
PIF_VALUE: 11
PIF_VALUE: 12
PIF_VALUE: 20
PIF_VALUE: 12
PIF_VALUE: 12
PIF_VALUE: 1
PIF_VALUE: 11
PIF_VALUE: 20
PIF_VALUE: 12
PIF_VALUE: 1
PIF_VALUE: 12
PIF_VALUE: 1
PIF_VALUE: 11
PIF_VALUE: 13
PIF_VALUE: 12
PIF_VALUE: 11
PIF_VALUE: 12
PIF_VALUE: 12
PIF_VALUE: 16
PIF_VALUE: 12
PIF_VALUE: 11
PIF_VALUE: 13
PIF_VALUE: 11
PIF_VALUE: 12
PIF_VALUE: 13
PIF_VALUE: 12
PIF_VALUE: 12
PIF_VALUE: 13
PIF_VALUE: 11
PIF_VALUE: 1
PIF_VALUE: 12
PIF_VALUE: 12
PIF_VALUE: 1
PIF_VALUE: 11
PIF_VALUE: 13
PIF_VALUE: 12
PIF_VALUE: 13
PIF_VALUE: 12
PIF_VALUE: 12
PIF_VALUE: 11
PIF_VALUE: 13

## 2020-09-29 ASSESSMENT — PAIN SCALES - GENERAL
PAINLEVEL_OUTOF10: 0
PAINLEVEL_OUTOF10: 0
PAINLEVEL_OUTOF10: 4

## 2020-09-29 ASSESSMENT — LIFESTYLE VARIABLES: SMOKING_STATUS: 0

## 2020-09-29 ASSESSMENT — PAIN DESCRIPTION - PAIN TYPE: TYPE: ACUTE PAIN

## 2020-09-29 NOTE — ANESTHESIA PRE PROCEDURE
Reactions    Cefuroxime Axetil     Codeine     Haldol [Haloperidol]     Rocephin [Ceftriaxone Sodium-Lidocaine]        Problem List:    Patient Active Problem List   Diagnosis Code    H/O blood clots Z86.718    Sick sinus syndrome ([facility] I49.5    Sinus arrest I45.5    Heart murmur R01.1    Status post placement of cardiac pacemaker Z95.0    Dyslipidemia E78.5    Controlled type 2 diabetes mellitus with complication, without long-term current use of insulin ([facility]) E11.8       Past Medical History:        Diagnosis Date    Asthma     Bleeding ulcer     diagnosed at age 17    Diabetes mellitus ([facility]     H/O blood clots may 1994    around pacemaker site, shoulder and arm    Sick sinus syndrome ([facility]     Sinus arrest 1994    requiring insertion of permanent pacemaker       Past Surgical History:        Procedure Laterality Date    APPENDECTOMY      BREAST BIOPSY Right 2019    BREAST LUMPECTOMY      3 benign lumps removed from left breast   Lizette Mona Garcia for sick sinus syndrome sinus arrest    CARDIAC PACEMAKER PLACEMENT      battery depletion of permanent pacemaker with replacement    CARDIAC PACEMAKER PLACEMENT      [facility] Pacesetter    CYST REMOVAL      cysts removed from hand, hip and foot    DIAGNOSTIC CARDIAC CATH LAB PROCEDURE  1990    [facility] normal coronaries, normal cath per PAW    DIAGNOSTIC CARDIAC CATH LAB PROCEDURE      mild dilated cardiomyopathy    EYE SURGERY Bilateral 2019    laser Sx (relieve pressure)    FOOT SURGERY  2004    broken right foot with removal of hematoma by dr Ashia Polk, RADICAL  2006    left mastectomy for cancer    TONSILLECTOMY         Social History:    Social History     Tobacco Use    Smoking status: Former Smoker     Packs/day: 0.50     Years: 20.00     Pack years: 10.00     Last attempt to quit: 2001     Years since quittin.7    Smokeless tobacco: Never Used    Tobacco comment: quit in 2001   Substance Use Topics    Alcohol use: No     Comment: daily pop                                Counseling given: Not Answered  Comment: quit in 2001      Vital Signs (Current):   Vitals:    09/29/20 0758   BP: (!) 151/114   Pulse: 106   Resp: (!) 97   Temp: 36.1 °C (97 °F)   TempSrc: Tympanic   SpO2: 95%   Weight: 190 lb (86.2 kg)   Height: 5' 5\" (1.651 m)                                              BP Readings from Last 3 Encounters:   09/29/20 (!) 151/114   06/30/20 128/76   11/12/19 128/72       NPO Status:                           >8 hrs                                                      BMI:   Wt Readings from Last 3 Encounters:   09/29/20 190 lb (86.2 kg)   06/30/20 195 lb (88.5 kg)   11/12/19 185 lb (83.9 kg)     Body mass index is 31.62 kg/m². CBC:   Lab Results   Component Value Date    WBC 7.5 09/29/2020    RBC 4.94 09/29/2020    HGB 15.0 09/29/2020    HCT 44.3 09/29/2020    MCV 89.7 09/29/2020    RDW 12.6 09/29/2020     09/29/2020       CMP:   Lab Results   Component Value Date     09/29/2020    K 3.7 09/29/2020     09/29/2020    CO2 25 09/29/2020    BUN 11 09/29/2020    CREATININE 0.7 09/29/2020    GFRAA >60 09/29/2020    LABGLOM >60 09/29/2020    GLUCOSE 111 09/29/2020    GLUCOSE 111 03/08/2012    PROT 7.1 08/27/2012    CALCIUM 9.2 09/29/2020    BILITOT 1.1 08/27/2012    ALKPHOS 59 08/27/2012    AST 24 08/27/2012    ALT 31 08/27/2012       POC Tests: No results for input(s): POCGLU, POCNA, POCK, POCCL, POCBUN, POCHEMO, POCHCT in the last 72 hours.     Coags: No results found for: PROTIME, INR, APTT    HCG (If Applicable): No results found for: PREGTESTUR, PREGSERUM, HCG, HCGQUANT     ABGs: No results found for: PHART, PO2ART, GVR5BBT, CJI2ZYF, BEART, G4MOCFEM     Type & Screen (If Applicable):  No results found for: LABABO, LABRH    Drug/Infectious Status (If Applicable):  No results found for: HIV, HEPCAB    COVID-19 Screening (If Applicable):   Lab Results   Component Value Date    COVID19 Not Detected 09/24/2020         Anesthesia Evaluation  Patient summary reviewed and Nursing notes reviewed no history of anesthetic complications:   Airway: Mallampati: II  TM distance: >3 FB   Neck ROM: full  Mouth opening: > = 3 FB Dental:    (+) upper dentures, lower dentures and edentulous  Comment: Dentures remain in    Pulmonary:normal exam  breath sounds clear to auscultation  (+) sleep apnea: on noncompliant,  asthma:     (-) not a current smoker                           Cardiovascular:  Exercise tolerance: good (>4 METS),   (+) pacemaker: pacemaker, dysrhythmias (SSS, sinus arrest):,         Rhythm: regular  Rate: normal      Cleared by cardiology              Neuro/Psych:               GI/Hepatic/Renal:   (+) PUD,           Endo/Other:    (+) DiabetesType II DM, well controlled, , blood dyscrasia: anticoagulation therapy:., malignancy/cancer (breast, s/p L mastectomy). Pt had no PAT visit       Abdominal:       Abdomen: soft. Vascular:           ROS comment: R foot and pacer site hematoma. Anesthesia Plan      MAC     ASA 3       Induction: intravenous. Anesthetic plan and risks discussed with patient. Use of blood products discussed with patient whom. Plan discussed with attending.                   Claudell Janus, APRN - CRNA   9/29/2020

## 2020-09-29 NOTE — OP NOTE
[facility]) Physicians- The Heart and Vascular [address] [place] [address] Electrophysiology  Procedure Report  PATIENT: [id number] [address] RECORD NUMBER: [id number]  DATE OF PROCEDURE:  9/29/2020  ATTENDING ELECTROPHYSIOLOGIST:  Jono Wang MD  REFERRING PHYSICIAN: Dr. Davidson Morel and Dr. Srinivasa Neville  Procedure Performed:  1. Generator Replacement of a  Dual Chamber Permanent Pacemaker ([employer])  2. Placement of New Right Atrial Pacemaker Lead     Indication for Procedure:  1. Pacemaker pulse generator at elective replacement interval  2. Right atrial lead malfunction    Flouroscopy: 7.5 min  Complications: none immediately apparent  EBL: Minimal  Specimens: none  Contrast: 20 cc    FINDINGS:  Implanted device information:  1. New Pulse Generator is an [employer]. Serial # [id number]  Placement: Right pectoral subcutaneous  Date of implant: 9/29/2020  2. Old Pulse Generator is an [employer]. Serial # [id number]  Placement: Right pectoral subcutaneous  Date of explant: 9/29/2020  Date of implant: 5/3/2010  3. New right atrial lead parameters are as follows:  Sullivan  Model # [id number]. Serial # [id number]  Lead position: RA  Date of implant: 9/29/2020  P-waves: >5.0 mV  Pacing threshold: 0.5 V at 0.5 ms. Impedance: 960 ohms. 4. Old right atrial lead parameters are as follows:  [employer]ics  Model # [id number]. Serial # [id number]  Lead position: RA  Date of implant: 2/23/1994  Date of abandonment: 9/29/2020  5. Right ventricular lead parameters are as follows:  [employer]  Model 330-258. Serial # [id number]  Lead position: RV  Date of implant: 2/23/2000  R-waves: 6.1mV  Pacing threshold: 1.5 V at 1.0 ms. Impedance: 550 ohms. 6. Bradycardia parameters:  Mode: DDDR  Base Rate: 60  Max Tracking Rate: 120    DETAILS OF THE OPERATION: The risks, benefits, alternatives of the procedure were explained to patient and family. They consented and agreed to proceed. Written consent was obtained in the chart.  Blood products are not routinely needed for such procedures. The patient was brought to the Electrophysiology lab in a fasting and non-sedated state. The patient had electrocardiographic and hemodynamic monitoring equipment placed. During the case the patient was under the care of an anesthesiologist/CRNA team for sedation and hemodynamic monitoring. A final time out was performed prior to beginning the procedure. The patient was prepped and draped in usual sterile fashion. Twenty mL of 2% lidocaine was used to anesthetize the right pectoral area. Using a #10 blade an incision was made over the patient's previous incision. Using combination of scalpel and plasma-blade, we dissected down to the device pocket. The device and was then removed from the pocket and the leads were disconnected from the header of the device. The RV leads was visually inspected and lead parameters were checked and deemed to adequate. The old right atrial lead was capped and then abandoned in the pocket. Using a modified Seldinger technique and a fluroscopic guidance, a guide wire was placed in the right subclavian vein. Over one of the short J-tipped guide wire, a 6-Fr Safe-sheath was passed. Through this, the right atrial lead was advanced without difficulty to the right atrial appendage using a combination of straight and curved stylets and under fluoroscopic guidance. Mapping of the lead was performed. Lead parameters were deemed to be adequate and lead was then actively fixated in place. The 6-Fr Safe- sheath was then removed. The lead was then sewn to the pre-pectoral fascia using 0 Ethibond sutures, suturing over the suture sleeve. The patient's previous device pocket was then extended inferiorly and medially to approximate the size of the new device. Hemostasis was achieved with electrocautery and confirmed visually. The device pocket was then irrigated with antibiotic vancomycin solution.  The leads were then attached to the appropriate binding posts on the header of the device. The set screws were then tightened with a torque wrench. Tug tests were performed on all leads to insure they are adequately fixated. The device and the leads were then placed within the device pocket inside of a Tyrx absorbable anti-microbial pouch. Lead parameters were then rechecked via the device and deemed to be adequate. Surgiflo was injected into the pocket to ensure hemostasis. The incision was closed with 3 layers of absorbable suture. The deepest of which was a 2-0 interrupted stitches followed by a 2nd layer of 3-0 running stitch performed perpendicular to the deep layer and, lastly, a 4-0 subcuticular stitch. The skin was then prepped with benzoin solution, Steri-Strips, and island Aquacel dressing were then applied. At the end of the case, the patient was hemodynamically stable and under the care of an anesthesiologist, the patient was brought back to the recovery area for post procedural monitoring. ASSESSMENT:  1. Successful generator change of an Abbott dual chamber permanent pacemaker for elective replacement indicator   2. Successful placement of new right atrial pacemaker lead    RECOMMENDATIONS:  1. Stat portable chest x-ray to rule out pneumothorax and check lead placement now and tomorrow morning. 2. EKG to be performed now. 3. Post-procedural monitoring in the recovery area. 4. The patient will be observed overnight on Telemetry. 5. The patient will receive 24-hours of aggie-operative intravenous antibiotics. 6. The patient's device will be interrogated in the morning by a representative of the device . 7. The patient is to follow-up in device clinic within 2 weeks upon discharge. 8. The patient is advised follow all post-operative device and wound care instructions as per the information provided in the pre-operative clinic.     Connie Rodriguez MD  Cardiac Electrophysiology  [facility]) Physicians  The Heart and Vascular [place]  11:24 AM  9/29/2020

## 2020-09-29 NOTE — CARE COORDINATION
Care Coordination:  Met with pt to discuss transition of care upon discharge. Pt has just admitted to unit, observation status for bradycardia. Lives at home with boyfriend, 2 story home, completely independent no hx of monika, hhc or dme. Boyfriend will transport home upon discharge. [facility] [address] in [address] [place] [address] and follows with Dr Leonarda Squires. Does not anticipate any home gong needs.   Pt is currently observation status      Nikolai Lindsey

## 2020-09-29 NOTE — H&P
OFFICE VISIT     PRIMARY CARE PHYSICIAN:      Gulshan Hansen MD       ALLERGIES / SENSITIVITIES:        Allergies   Allergen Reactions    Cefuroxime Axetil     Codeine     Haldol [Haloperidol]     Rocephin [Ceftriaxone Sodium-Lidocaine]           REVIEWED MEDICATIONS:        Current Outpatient Medications:     simvastatin (ZOCOR) 10 MG tablet, TAKE 1 TABLET NIGHTLY, Disp: 90 tablet, Rfl: 3    furosemide (LASIX) 20 MG tablet, TAKE 1 TABLET DAILY, Disp: 90 tablet, Rfl: 3    pioglitazone (ACTOS) 15 MG tablet, TAKE 1 TABLET BY MOUTH EVERY DAY, Disp: , Rfl: 1    Multiple Vitamins-Minerals (THERAPEUTIC MULTIVITAMIN-MINERALS) tablet, Take 1 tablet by mouth daily. , Disp: , Rfl:     loratadine (CLARITIN) 10 MG tablet, Take 10 mg by mouth daily. , Disp: , Rfl:     aspirin 81 MG tablet, Take 81 mg by mouth daily. , Disp: , Rfl:     LORazepam (ATIVAN) 1 MG tablet, Take 1 mg by mouth 3 times daily. , Disp: , Rfl:       S: REASON FOR VISIT:       No chief complaint on file. History of Present Illness:       Office Visit for follow up of bradycardia, pacemaker   78 yr pt with hx of Bradycardia, s/ pacemkasr 1994, dyslipidemia came for f/u visit   No hospitalizations or surgeries since last visit   Patient is compliant with all medications   ' lump\" near lateral malleoli    Guillaume any exertional chest pain or short of breath   No palpitations, dizzy or syncope.    Active at home   No orthopnea   Try to watch diet          Past Medical History:   Diagnosis Date    Asthma     Bleeding ulcer     diagnosed at age 17    Diabetes mellitus ([facility]     H/O blood clots may 1994    around pacemaker site, shoulder and arm    Sick sinus syndrome ([facility]     Sinus arrest february 1994    requiring insertion of permanent pacemaker            Past Surgical History:   Procedure Laterality Date    APPENDECTOMY      BREAST BIOPSY Right 05/2019    BREAST LUMPECTOMY      3 benign lumps removed from left breast    [facility] sick [facility] sinus [facility]    CARDIAC PACEMAKER PLACEMENT      battery depletion of permanent pacemaker with replacement    CARDIAC PACEMAKER PLACEMENT  2000    PGR Pacesetter    CYST REMOVAL      cysts removed from hand, hip and foot    DIAGNOSTIC CARDIAC CATH LAB PROCEDURE  1990    [facility] normal coronaries, normal cath per PAW    DIAGNOSTIC CARDIAC CATH LAB PROCEDURE      mild dilated cardiomyopathy    EYE SURGERY Bilateral 2019    laser Sx (relieve pressure)    FOOT SURGERY  2004    broken right foot with removal of hematoma by dr Nancy Goodman, [facility]  2006    left mastectomy for cancer    TONSILLECTOMY          History reviewed. No pertinent family history.        Social History     Tobacco Use    Smoking status: Former Smoker     Packs/day: 0.50     Years: 20.00     Pack years: 10.00     Last attempt to quit: 2001     Years since quittin.7    Smokeless tobacco: Never Used    Tobacco comment: quit in    Substance Use Topics    Alcohol use: No     Comment: daily pop         Review of Systems:  Constitutional: negative for fever and chills, or significant weight loss  HEENT: negative for acute visual symptoms or auditory problems, no dysphagia  Respiratory: negative for cough, wheezing, or hemoptysis  Cardiovascular: negative for chest pain, palpitations, and dyspnea  Gastrointestinal: negative for abdominal pain, diarrhea, nausea and vomiting  Endocrine: Negative for polyuria and polydyspsia  Genitourinary:negative for dysuria and hematuria  Derm: negative for rash and skin lesion(s)  Neurological: negative for tingling, numbness, weakness, seizures and tremors  Endocrine: negative for polydipsia and polyuria  Musculoskeletal: negative for pain or tenderness  Psychiatric: negative for anxiety, depression, or suicidal ideations         O:  COMPLETE PHYSICAL EXAM:       BP (!) 151/114   Pulse 106   Temp 97 °F (36.1 °C) (Tympanic)   Resp (!) 97   Ht 5' 5\" (1.651 m)   Wt 190 lb (86.2 kg)   SpO2 95%   BMI 31.62 kg/m²       General:   Patient alert, comfortable, no distress. Appears stated age. HEENT:    Pupils equal, no icterus, no nasal drainage, tongue moist.   Neck:              No masses, Thyroid not palpable. No elevated JVD, No carotid bruit. Chest:   Normal configuration, non tender. Pacer site OK   Lungs:   Clear to auscultation bilaterally, few scattered rhonchi. Cardiovascular:  Regular rhythm, 1/6 systolic murmur, No S3, PMI at 5th ICS, no palpable thrills,    Abdomen:  Soft, Non tender, Bowel sounds normal, no pulsatile abdominal aorta, no palpable masses. Extremities:  No edema. Distal pulses palpable. No cyanosis, no clubbing. \" SOFT  swellIng near both lateral malleolus, 1.5\"    Skin:   Good turgor, warm and dry, no cyanosis. Musculoskeletal: No joint swelling or deformity. Neuro:   Cranial nerves grossly intact; No focal neurologic deficit. Psych:   Alert, good mood and effect. REVIEW OF DIAGNOSTIC TESTS:        Electrocardiogram: Reviewed    Pacemaker:  Interrogated, OK - bATTERY 3-6 MONTHS            A/P:   ASSESSMENT / PLAN:    Jenifer Gan was seen today for bradycardia. Diagnoses and all orders for this visit:    Status post placement of cardiac pacemaker, 2/1994- Dr Mercedes at [facility], s/p PGR 2000, s/p PGR 2010- [facility]; High Atrial lead impedence - Monitor A-lead  Impedence; Pt aware;  She needs annual fluoroscopy of atrial Encore lead with retained stylet, per  advisory - New Atrial lead at the time of PGR or sooner;    - 12 lead EKG    Dyslipidemia, on Statin     Sick sinus syndrome ([address] - s/p Pacer   - 12 lead EKG    S/P mastectomy, left     Controlled type 2 diabetes mellitus with complication, without long-term current use of insulin (HCC)     Non morbid obesity: Diet, exercise and weight loss discussed.     Preventive Cardiology: Low cholesterol

## 2020-09-30 ENCOUNTER — APPOINTMENT (OUTPATIENT)
Dept: GENERAL RADIOLOGY | Age: 70
DRG: 242 | End: 2020-09-30
Attending: INTERNAL MEDICINE
Payer: MEDICARE

## 2020-09-30 LAB
ANION GAP SERPL CALCULATED.3IONS-SCNC: 14 MMOL/L (ref 7–16)
BUN BLDV-MCNC: 11 MG/DL (ref 8–23)
CALCIUM SERPL-MCNC: 8.7 MG/DL (ref 8.6–10.2)
CHLORIDE BLD-SCNC: 103 MMOL/L (ref 98–107)
CO2: 23 MMOL/L (ref 22–29)
CREAT SERPL-MCNC: 0.6 MG/DL (ref 0.5–1)
GFR AFRICAN AMERICAN: >60
GFR NON-AFRICAN AMERICAN: >60 ML/MIN/1.73
GLUCOSE BLD-MCNC: 114 MG/DL (ref 74–99)
HCT VFR BLD CALC: 40.6 % (ref 34–48)
HEMOGLOBIN: 13.3 G/DL (ref 11.5–15.5)
MAGNESIUM: 2.1 MG/DL (ref 1.6–2.6)
MCH RBC QN AUTO: 30.2 PG (ref 26–35)
MCHC RBC AUTO-ENTMCNC: 32.8 % (ref 32–34.5)
MCV RBC AUTO: 92.3 FL (ref 80–99.9)
PDW BLD-RTO: 12.8 FL (ref 11.5–15)
PLATELET # BLD: 239 E9/L (ref 130–450)
PMV BLD AUTO: 10.1 FL (ref 7–12)
POTASSIUM SERPL-SCNC: 3.4 MMOL/L (ref 3.5–5)
RBC # BLD: 4.4 E12/L (ref 3.5–5.5)
SODIUM BLD-SCNC: 140 MMOL/L (ref 132–146)
WBC # BLD: 9.5 E9/L (ref 4.5–11.5)

## 2020-09-30 PROCEDURE — 6370000000 HC RX 637 (ALT 250 FOR IP): Performed by: INTERNAL MEDICINE

## 2020-09-30 PROCEDURE — 6360000002 HC RX W HCPCS: Performed by: INTERNAL MEDICINE

## 2020-09-30 PROCEDURE — 85027 COMPLETE CBC AUTOMATED: CPT

## 2020-09-30 PROCEDURE — 96374 THER/PROPH/DIAG INJ IV PUSH: CPT

## 2020-09-30 PROCEDURE — 83735 ASSAY OF MAGNESIUM: CPT

## 2020-09-30 PROCEDURE — 32551 INSERTION OF CHEST TUBE: CPT | Performed by: INTERNAL MEDICINE

## 2020-09-30 PROCEDURE — 71045 X-RAY EXAM CHEST 1 VIEW: CPT

## 2020-09-30 PROCEDURE — 80048 BASIC METABOLIC PNL TOTAL CA: CPT

## 2020-09-30 PROCEDURE — 2500000003 HC RX 250 WO HCPCS

## 2020-09-30 PROCEDURE — 2580000003 HC RX 258: Performed by: INTERNAL MEDICINE

## 2020-09-30 PROCEDURE — 36415 COLL VENOUS BLD VENIPUNCTURE: CPT

## 2020-09-30 PROCEDURE — 99024 POSTOP FOLLOW-UP VISIT: CPT | Performed by: INTERNAL MEDICINE

## 2020-09-30 PROCEDURE — 93280 PM DEVICE PROGR EVAL DUAL: CPT | Performed by: INTERNAL MEDICINE

## 2020-09-30 PROCEDURE — G0378 HOSPITAL OBSERVATION PER HR: HCPCS

## 2020-09-30 PROCEDURE — 0W9930Z DRAINAGE OF RIGHT PLEURAL CAVITY WITH DRAINAGE DEVICE, PERCUTANEOUS APPROACH: ICD-10-PCS | Performed by: INTERNAL MEDICINE

## 2020-09-30 PROCEDURE — 96376 TX/PRO/DX INJ SAME DRUG ADON: CPT

## 2020-09-30 RX ORDER — LIDOCAINE HYDROCHLORIDE 10 MG/ML
5 INJECTION, SOLUTION INFILTRATION; PERINEURAL ONCE
Status: COMPLETED | OUTPATIENT
Start: 2020-09-30 | End: 2020-09-30

## 2020-09-30 RX ORDER — LIDOCAINE HYDROCHLORIDE 10 MG/ML
INJECTION, SOLUTION INFILTRATION; PERINEURAL
Status: COMPLETED
Start: 2020-09-30 | End: 2020-09-30

## 2020-09-30 RX ORDER — FENTANYL CITRATE 50 UG/ML
25 INJECTION, SOLUTION INTRAMUSCULAR; INTRAVENOUS
Status: DISCONTINUED | OUTPATIENT
Start: 2020-09-30 | End: 2020-10-06 | Stop reason: HOSPADM

## 2020-09-30 RX ADMIN — LORAZEPAM 1 MG: 1 TABLET ORAL at 17:32

## 2020-09-30 RX ADMIN — PIOGLITAZONE 15 MG: 15 TABLET ORAL at 10:32

## 2020-09-30 RX ADMIN — Medication 10 ML: at 22:53

## 2020-09-30 RX ADMIN — LORATADINE 10 MG: 10 TABLET ORAL at 10:32

## 2020-09-30 RX ADMIN — LORAZEPAM 1 MG: 1 TABLET ORAL at 10:32

## 2020-09-30 RX ADMIN — ASPIRIN 81 MG CHEWABLE TABLET 81 MG: 81 TABLET CHEWABLE at 10:32

## 2020-09-30 RX ADMIN — FENTANYL CITRATE 25 MCG: 0.05 INJECTION, SOLUTION INTRAMUSCULAR; INTRAVENOUS at 12:17

## 2020-09-30 RX ADMIN — LIDOCAINE HYDROCHLORIDE 5 ML: 10 INJECTION, SOLUTION INFILTRATION; PERINEURAL at 10:34

## 2020-09-30 RX ADMIN — SODIUM CHLORIDE, PRESERVATIVE FREE 10 ML: 5 INJECTION INTRAVENOUS at 17:33

## 2020-09-30 RX ADMIN — SODIUM CHLORIDE: 9 INJECTION, SOLUTION INTRAVENOUS at 17:36

## 2020-09-30 RX ADMIN — MULTIPLE VITAMINS W/ MINERALS TAB 1 TABLET: TAB at 10:32

## 2020-09-30 RX ADMIN — LORAZEPAM 1 MG: 1 TABLET ORAL at 22:52

## 2020-09-30 RX ADMIN — Medication 10 ML: at 10:34

## 2020-09-30 RX ADMIN — FENTANYL CITRATE 25 MCG: 0.05 INJECTION, SOLUTION INTRAMUSCULAR; INTRAVENOUS at 17:33

## 2020-09-30 RX ADMIN — SIMVASTATIN 10 MG: 10 TABLET, FILM COATED ORAL at 22:52

## 2020-09-30 ASSESSMENT — PAIN DESCRIPTION - ORIENTATION: ORIENTATION: RIGHT

## 2020-09-30 ASSESSMENT — PAIN DESCRIPTION - FREQUENCY: FREQUENCY: CONTINUOUS

## 2020-09-30 ASSESSMENT — PAIN SCALES - GENERAL
PAINLEVEL_OUTOF10: 8
PAINLEVEL_OUTOF10: 0
PAINLEVEL_OUTOF10: 2
PAINLEVEL_OUTOF10: 2
PAINLEVEL_OUTOF10: 0
PAINLEVEL_OUTOF10: 7

## 2020-09-30 ASSESSMENT — PAIN DESCRIPTION - PAIN TYPE
TYPE: ACUTE PAIN
TYPE: ACUTE PAIN

## 2020-09-30 ASSESSMENT — PAIN DESCRIPTION - LOCATION: LOCATION: FLANK

## 2020-09-30 ASSESSMENT — PAIN DESCRIPTION - PROGRESSION: CLINICAL_PROGRESSION: GRADUALLY WORSENING

## 2020-09-30 ASSESSMENT — PAIN DESCRIPTION - ONSET: ONSET: ON-GOING

## 2020-09-30 ASSESSMENT — PAIN DESCRIPTION - DESCRIPTORS: DESCRIPTORS: SHOOTING;SHARP

## 2020-09-30 NOTE — PROGRESS NOTES
Cardiac Electrophysiology Inpatient Progress Note    Annie Cranker  1950  Date of Service: 9/30/2020  PCP: MD Reema Bermudez MD  Electrophysiologist: Estephania Roy MD        Subjective: Annie Cranker is seen in hospital follow-up and management of: CRT-P generator replacement and new right atrial pacemaker lead. 09/30/20: Annie Cranker is seen in hospital follow-up she under went generator replacement and placement of a new right atrial lead  With the capping and abandonment of her prior atrial lead. This morning she had decreased breath sounds and was tachycardiac. A chest X ray showed a large right sided pneumothorax with slight mediastinal shift to the left. Dr. Ed Chan was consulted and urgently placed a right sided chest tube/pig tail cathter post placement of the chest tube aidee reported improved respirations. Her device function is normal on post op day 1, her dressing is clean dry and intact the device site is without edema or ecchymosis.      Patient Active Problem List   Diagnosis    H/O blood clots    Sick sinus syndrome (HCC)    Sinus arrest    Heart murmur    Status post placement of cardiac pacemaker    Dyslipidemia    Controlled type 2 diabetes mellitus with complication, without long-term current use of insulin (HCC)    S/P placement of cardiac pacemaker    Elective replacement indicated for cardiac pacemaker battery at end of lifespan         Scheduled Medications:   aspirin  81 mg Oral Daily    LORazepam  1 mg Oral TID    loratadine  10 mg Oral Daily    therapeutic multivitamin-minerals  1 tablet Oral Daily    pioglitazone  15 mg Oral Daily    simvastatin  10 mg Oral Nightly    furosemide  20 mg Oral Daily    sodium chloride flush  10 mL Intravenous 2 times per day       Infusion Medications:   sodium chloride Stopped (09/29/20 1241)       PRN Medications:  fentanNYL, sodium chloride flush, acetaminophen    Allergies   Allergen Reactions  Cefuroxime Axetil     Codeine     Haldol [Haloperidol]     Rocephin [Ceftriaxone Sodium-Lidocaine]        Wt Readings from Last 3 Encounters:   09/29/20 190 lb (86.2 kg)   06/30/20 195 lb (88.5 kg)   11/12/19 185 lb (83.9 kg)     Temp Readings from Last 3 Encounters:   09/30/20 97.6 °F (36.4 °C) (Infrared)   08/17/15 97.7 °F (36.5 °C)   03/12/14 98.2 °F (36.8 °C)     BP Readings from Last 3 Encounters:   09/30/20 (!) 162/82   09/29/20 93/78   06/30/20 128/76     Pulse Readings from Last 3 Encounters:   09/30/20 102   06/30/20 98   11/12/19 91         Intake/Output Summary (Last 24 hours) at 9/30/2020 1247  Last data filed at 9/30/2020 0823  Gross per 24 hour   Intake 1200 ml   Output --   Net 1200 ml       ROS:   Constitutional: Negative for fever, activity change and appetite change. HENT: Negative for epistaxis. Eyes: Negative for diploplia, blurred vision. Respiratory: Negative for cough, chest tightness, shortness of breath and wheezing. Cardiovascular: pertinent positives in HPI  Gastrointestinal: Negative for abdominal pain and blood in stool. All other review of systems are negative     PHYSICAL EXAM:  Vitals:    09/29/20 1431 09/29/20 2230 09/30/20 0249 09/30/20 0936   BP: 119/69 (!) 167/98 125/77 (!) 162/82   Pulse: 82 91 91 102   Resp: 16 16 16 16   Temp: 98 °F (36.7 °C)  98.4 °F (36.9 °C) 97.6 °F (36.4 °C)   TempSrc: Infrared  Infrared Infrared   SpO2: 98% 91% 90% 92%   Weight:       Height:         Constitutional: Well-developed, slight dyspnea   Eyes: conjunctivae normal, no xanthelasma   Ears, Nose, Throat: oral mucosa moist, no cyanosis   CV: no JVD. Regular rate and rhythm. Normal S1S2 and no S3. No murmurs, rubs, or gallops.  PMI is nondisplaced  Lungs:decreased on the right sied, normal respiratory effort without used of accessory muscles  Abdomen: soft, non-tender, bowel sounds present, no masses or hepatomegaly   Musculoskeletal: no digital clubbing, no edema   Skin: warm, no flash     Pertinent Labs:  CBC:   Recent Labs     20  0755 20  0522   WBC 7.5 9.5   HGB 15.0 13.3   HCT 44.3 40.6    239     BMP:  Recent Labs     20  0755 20  0522    140   K 3.7 3.4*    103   CO2 25 23   BUN 11 11   CREATININE 0.7 0.6   CALCIUM 9.2 8.7     ABGs: No results found for: PHART, PO2ART, AUZ8AUN  INR: No results for input(s): INR in the last 72 hours. BNP: No results for input(s): BNP in the last 72 hours. TSH:   Lab Results   Component Value Date    TSH 2.395 2013      Cardiac Injury Profile: No results for input(s): CKTOTAL, CKMB, CKMBINDEX, TROPONINI in the last 72 hours. Lipid Profile:   Lab Results   Component Value Date    TRIG 152 2014    HDL 31 2014    LDLCALC 110 2014    CHOL 171 2014      Hemoglobin A1C: No components found for: HGBA1C   Xray: Xr Chest Portable    Result Date: 2020  Patient MRN: [id number] : 1950 Age:  78 years Gender: Female Order Date: 2020 11:20 AM Exam: XR CHEST PORTABLE Number of Images: 1 view Indication:  Follow-up right pneumothorax after placement of pigtail chest drain. Comparison: 2020, 0811 hours. FINDINGS: There is now a pigtail chest drain present on the right. The right pneumothorax has resolved. The transvenous pacemaker is stable. There are no new abnormal findings. Resolution of right pneumothorax after placement of chest drain. Xr Chest Portable    Result Date: 2020  Patient MRN:  [id number] : 1950 Age: 78 years Gender: Female Order Date:  2020 8:00 AM EXAM: XR CHEST PORTABLE INDICATION:  post  pacemaker post  pacemaker COMPARISON: 2020 FINDINGS:  There is been placement of a right-sided pacer. There is a large right-sided pneumothorax with questionable slight mediastinal shift to the left. Heart size is normal. Left lung is clear.      Large right-sided pneumothorax with suggestion of slight mediastinal shift to the left Pleural Effusion   No evidence of pleural effusion. Aorta   Normal aortic root size and ascending aorta. Miscellaneous   The inferior vena cava diameter is normal with normal respiratory   variation. Conclusions      Summary   Normal left ventricular chamber size. Normal left ventricular systolic function, LVEF is 55-60%. Stage I diastolic dysfunction. Normal right ventricle size and function. Pacer lead in RV. The aortic valve appears mildly sclerotic. No hemodynamically significant aortic stenosis is present. There is trace tricuspid regurgitation, RVSP 24mmHg. Normal aortic root size. No evidence of pericardial effusion. Pericardium appears normal.   No intra cardiac mass or thrombus. No recent comparison study available.       Signature      ----------------------------------------------------------------   Electronically signed by Nik Neal MD(Interpreting   physician) on 09/15/2020 04:59 PM   ----------------------------------------------------------------     M-Mode/2D Measurements & Calculations      LV Diastolic    LV Systolic Dimension: 3.3   AV Cusp Separation: 1.9 cmLA   Dimension: 4.6  cm                           Dimension: 3.4 cmAO Root   cm              LV Volume Diastolic: 40.2 ml Dimension: 3.3 cm   LV FS:28.3 %    LV Volume Systolic: 67.5 ml   LV PW           LV EDV/LV EDV Index: 48.5   Diastolic: 1 cm TC/94 VN/E^2EQ ESV/LV ESV   Septum          Index: 43.2 ml/22ml/ m^2     RV Diastolic Dimension: 2.6   Diastolic: 0.9  EF Calculated: 55.6 %        cm   cm              LV Mass Index: 76 l/min*m^2      LV Mass: 148.1                               LA volume/Index: 37.8 ml   g               LVOT: 2.3 cm                 RA Area: 11.4 cm^2     Doppler Measurements & Calculations      MV Peak E-Wave: 0.53 AV Peak Velocity:     LVOT Peak Velocity: 0.92 m/s   m/s                  1.13 m/s              LVOT Mean Velocity: 0.55 m/s   MV Peak A-Wave: 0.67 AV Peak Gradient:     LVOT Peak Gradient: 3.4   m/s                  5.09 mmHg             mmHgLVOT Mean Gradient: 1.4   MV E/A Ratio: 0.79   AV Mean Velocity:     mmHg   MV Peak Gradient:    0.79 m/s              Estimated RVSP: 21.5 mmHg   2.1 mmHg             AV Mean Gradient: 2.1 Estimated RAP:3 mmHg   MV Mean Gradient:    mmHg   0.9 mmHg             AV VTI: 20.6 cm   MV Mean Velocity:    AV Area               TR Velocity:2.15 m/s   0.49 m/s             (Continuity):3.31     TR Gradient:18.52 mmHg   MV Deceleration      cm^2                  PV Peak Velocity: 0.8 m/s   Time: 217.3 msec                           PV Peak Gradient: 2.59 mmHg   MV P1/2t: 50.5 msec  LVOT VTI: 16.4 cm     PV Mean Velocity: 0.49 m/s   MVA by PHT:4.36 cm^2                       PV Mean Gradient: 1.2 mmHg   MV Area              Estimated PASP: 21.52   (continuity): 4.1    mmHg   cm^2     [email or web address] [id number]%3d%3d      Telemetry9/30/2020:  AS-VS with a rate of 107bpm     ECG 9/30/2020: Normal sinus rhythm rate 78 bpmm QRS 94, QTc 453ms     Device Interrogation ( 09/30/20 )  Make/Model [employer] [facility] [employer] MRI   Mode DDDR 70/130  P wave: >5.0 mV  Impedance: 760 ohms   Threshold: 0.5 V @ 0.5 ms  RV R wave: 8.1 mV  Impedance: 580 ohms   Threshold: 1.25 V @ 1.0 ms  Pacing: A: <1%  RV: <1%  Battery Voltage/Longevity:  4.6-11.4 years     Arrhythmias: none   Reprogramming included none   Overall device function is normal  All device programmable settings were evaluated per above and in the scanned document, along with iterative adjustments (capture thresholds) to assess and select the most appropriate final programming to provide for consistent delivery of the appropriate therapy and to verify function of the device. I have independently reviewed all of the ECGs and rhythm strips per above.     I have personally reviewed the laboratory, cardiac diagnostic and radiographic testing as outlined above. I have reviewed previous records noted in [address]. Impression:    1. Pacemaker in situ  - [address] at [facility]  - right sided   - DOI 2/1994   - PGR 2010   - Retained atrial stylet with recommend yearly fluoroscopy per  advisory   - Previously high atrial lead impedence   - Prior Atrial lead abandoned NOT MRI compatible  - New atrial lead placed, PGR 09/29/20  - Post op pneumothorax treated with pigtail chest tube     2. Sick sinus syndrome s/p pacemaker     3. hyperlipidemia   -  Statin    4. Left mastectomy     5. Type 2 diabetes mellitus   - Actose    6. Anxiety  - Ativan          Recommendations:    1. Normal device function   2. Stat consult to pulmonology   3. Will follow. Thank you for allowing me to participate in your patient's care. Discussed with Dr. Sera Mcneil   Electronically signed by MARGUERITE Parish CNP on 9/30/2020 at 1:14 PM   [address] [facility]    Attending Physician's Statement    Patient seen with the ANP. Agree with the findings, assessment and plan. Management plan was discussed. I have personally interviewed the patient, independently performed a focused cardiac examination, reviewed the pertinent laboratory and diagnostic testing with the patient and directly participated in the medical decision-making as noted above with the following additions:     Developed chest pain last night. Noted to be in sinus tachycardia this AM. Chest x-ray showed large pneumothorax. Pacemaker interrogation showed normal     Physical exam showed no JVD, RRR, normal S1S2, no murmur, decreased BS at right lung, no edema    Normal pacemaker function. Will consult Pulmonary for chest tube insertion for right side pneumothorax. Discussed the finding and management with patient and her .  Discussed with Dr. Kingsley Gonzalez Karishma.    I have spent a total of 35 minutes critical care with the patient and the family.     Frieda Pritchard MD  Cardiac Electrophysiology  [facility]  [facility] [place]: [facility]  3:48 PM  9/30/2020

## 2020-09-30 NOTE — CONSULTS
Pulmonary [address] [facility]                             Pulmonary Consult/Progress Note :          Patient: Kanu Storey  MRN: [id number]  : 1950      Date of Admission: .2020  1:59 PM    Consulting Tangela Zheng        Reason for Consultation: Tension pneumothorax  CC :  Worsening shortness of breath  HPI:   Kanu Storey is a 78y.o. year old with 21 pack/year smoking history, who underwent elective pacemaker placement, chest x-ray after procedure shows mild pneumothorax, it seems he has symptoms of progressed and there was expansion of her pneumothorax, pulmonary was consulted as the patient started to have shortness of breath but she was not in any distress and she was breathing well when I saw her despite she is requiring oxygen    She denies any chest pain, denies any fever or chills      PAST MEDICAL HISTORY:   Past Medical History:   Diagnosis Date    Asthma     Bleeding ulcer     diagnosed at age 17    [facility] mellitus ([facility] [address] [address]     H/O blood clots may 1994    around pacemaker site, shoulder and arm    Sick sinus syndrome ([facility] [address]     Sinus arrest 1994    requiring insertion of permanent pacemaker       PAST SURGICAL HISTORY:   Past Surgical History:   Procedure Laterality Date    APPENDECTOMY      BREAST BIOPSY Right 2019    BREAST LUMPECTOMY      3 benign lumps removed from left breast   [facility]    dr Porsha Choe for sick sinus syndrome sinus arrest    CARDIAC PACEMAKER PLACEMENT      battery depletion of permanent pacemaker with replacement    CARDIAC PACEMAKER PLACEMENT      [facility]    CYST REMOVAL      cysts removed from hand, hip and foot    DIAGNOSTIC CARDIAC CATH LAB PROCEDURE  1990    [facility] normal coronaries, normal cath per PAW    DIAGNOSTIC CARDIAC CATH LAB PROCEDURE      mild dilated cardiomyopathy    EYE SURGERY Bilateral 2019    laser Sx (relieve pressure)    FOOT SURGERY  december     broken right foot with removal of hematoma by dr Mehul Costa, RADICAL  2006    left mastectomy for cancer    PACEMAKER CHANGE  32    PGR & NEW RA LEAD      ([place])    DR. Priya Flood     TONSILLECTOMY         FAMILY HISTORY:   History reviewed. No pertinent family history.     SOCIAL HISTORY:   Social History     Socioeconomic History    Marital status:      Spouse name: Not on file    Number of children: Not on file    Years of education: Not on file    Highest education level: Not on file   Occupational History    Not on file   Social Needs    Financial resource strain: Not on file    Food insecurity     Worry: Not on file     Inability: Not on file    Transportation needs     Medical: Not on file     Non-medical: Not on file   Tobacco Use    Smoking status: Former Smoker     Packs/day: 0.50     Years: 20.00     Pack years: 10.00     Last attempt to quit: 2001     Years since quittin.7    Smokeless tobacco: Never Used    Tobacco comment: quit in    Substance and Sexual Activity    Alcohol use: No     Comment: daily pop    Drug use: No    Sexual activity: Not on file   Lifestyle    Physical activity     Days per week: Not on file     Minutes per session: Not on file    Stress: Not on file   Relationships    Social connections     Talks on phone: Not on file     Gets together: Not on file     Attends Mosque service: Not on file     Active member of club or organization: Not on file     Attends meetings of clubs or organizations: Not on file     Relationship status: Not on file    Intimate partner violence     Fear of current or ex partner: Not on file     Emotionally abused: Not on file     Physically abused: Not on file     Forced sexual activity: Not on file   Other Topics Concern    Not on file   Social History Narrative    Not on file     Social History     Tobacco Use   Smoking Status Former Smoker    Packs/day: 0.50  Years: 20.00    Pack years: 10.00    Last attempt to quit: 2001    Years since quittin.7   Smokeless Tobacco Never Used   Tobacco Comment    quit in      Social History     Substance and Sexual Activity   Alcohol Use No    Comment: daily pop     Social History     Substance and Sexual Activity   Drug Use No           HOME MEDICATIONS:  Prior to Admission medications    Medication Sig Start Date End Date Taking? Authorizing Provider   simvastatin (ZOCOR) 10 MG tablet TAKE 1 TABLET NIGHTLY 6/15/20  Yes Pascale Schwab MD   furosemide (LASIX) 20 MG tablet TAKE 1 TABLET DAILY 6/15/20  Yes Pascale Schwab MD   pioglitazone (ACTOS) 15 MG tablet TAKE 1 TABLET BY MOUTH EVERY DAY 10/20/18  Yes Historical Provider, MD   Multiple Vitamins-Minerals (THERAPEUTIC MULTIVITAMIN-MINERALS) tablet Take 1 tablet by mouth daily. Yes Historical Provider, MD   loratadine (CLARITIN) 10 MG tablet Take 10 mg by mouth daily. Yes Historical Provider, MD   aspirin 81 MG tablet Take 81 mg by mouth daily. Yes Historical Provider, MD   LORazepam (ATIVAN) 1 MG tablet Take 1 mg by mouth 3 times daily.    Yes Historical Provider, MD       CURRENT MEDICATIONS:  Current Facility-Administered Medications: fentaNYL (SUBLIMAZE) injection 25 mcg, 25 mcg, Intravenous, Q1H PRN  0.9 % sodium chloride infusion, , Intravenous, Continuous  aspirin chewable tablet 81 mg, 81 mg, Oral, Daily  LORazepam (ATIVAN) tablet 1 mg, 1 mg, Oral, TID  loratadine (CLARITIN) tablet 10 mg, 10 mg, Oral, Daily  therapeutic multivitamin-minerals 1 tablet, 1 tablet, Oral, Daily  pioglitazone (ACTOS) tablet 15 mg, 15 mg, Oral, Daily  simvastatin (ZOCOR) tablet 10 mg, 10 mg, Oral, Nightly  furosemide (LASIX) tablet 20 mg, 20 mg, Oral, Daily  sodium chloride flush 0.9 % injection 10 mL, 10 mL, Intravenous, 2 times per day  sodium chloride flush 0.9 % injection 10 mL, 10 mL, Intravenous, PRN  acetaminophen (TYLENOL) tablet 650 mg, 650 mg, Oral, Q4H PRN    IV MEDICATIONS:   sodium chloride Stopped (09/29/20 1241)       ALLERGIES:  Allergies   Allergen Reactions    Cefuroxime Axetil     Codeine     Haldol [Haloperidol]     Rocephin [Ceftriaxone Sodium-Lidocaine]        REVIEW OF SYSTEMS:  General ROS:  No weight loss ,no fatigue     ENT ROS:   No Sore throat ,no lymphoadenopathy,no nasal stuffiness     Hematological and Lymphatic ROS:   No ecchymosis ,no tendency to bleed  Respiratory ROS:   Shortness of breath  Cardiovascular ROS:   No CP,No Palpitation   Gastrointestinal ROS:   No Gi bleed,no nausea or vomiting      - Musculoskeletal ROS:      - no joint swelling ,no joint pain   Neurological ROS:     -no weakness or numbness    Dermatological ROS:   No skin rash ,no urticaria     PHYSICAL EXAMINATION:     VITAL SIGNS:  BP (!) 162/82   Pulse 102   Temp 97.6 °F (36.4 °C) (Infrared)   Resp 16   Ht 5' 5\" (1.651 m)   Wt 190 lb (86.2 kg)   SpO2 92%   BMI 31.62 kg/m²   Wt Readings from Last 3 Encounters:   09/29/20 190 lb (86.2 kg)   06/30/20 195 lb (88.5 kg)   11/12/19 185 lb (83.9 kg)     Temp Readings from Last 3 Encounters:   09/30/20 97.6 °F (36.4 °C) (Infrared)   08/17/15 97.7 °F (36.5 °C)   03/12/14 98.2 °F (36.8 °C)     TMAX:  BP Readings from Last 3 Encounters:   09/30/20 (!) 162/82   09/29/20 93/78   06/30/20 128/76     Pulse Readings from Last 3 Encounters:   09/30/20 102   06/30/20 98   11/12/19 91           INTAKE/OUTPUTS:  I/O last 3 completed shifts:   In: 1380 [P.O.:1080; I.V.:300]  Out: -     Intake/Output Summary (Last 24 hours) at 9/30/2020 1352  Last data filed at 9/30/2020 1820  Gross per 24 hour   Intake 1200 ml   Output --   Net 1200 ml       General Appearance: alert and oriented to person, place and time, well-developed and   well-nourished, in no acute distress   Eyes: pupils equal, round, and reactive to light, extraocular eye movements intact, conjunctivae normal and sclera anicteric   Neck: neck supple and non tender without

## 2020-09-30 NOTE — CARE COORDINATION
SOCIAL WORK/DISCHARGE PLANNING;  Pt seen in room. She had insertion of chest tube this a.m. Per pt, plan remains to return home when medically ready. Discussed HHC with pt. She is not sure if it will be needed and will make final decision closer to discharge. Will continue to follow.   Priscila Falcon [facility]  [phone]

## 2020-09-30 NOTE — OP NOTE
Pulmonary [address] [facility]                             Pulmonary Consult/Progress Note :           CHEST TUBE/PIGTAIL CATHTER  INSERTION             Patient:  Philip Figueroa 78 y.o. female   MRN: [id number]       Date of Service: 9/30/2020       Indications:  [x]   Drainage  large tension pneumothorax        Pre-Op Diagnosis:pneumothorax   Post-Op Diagnosis: Same    Performed by: Gabrielle Durham MD      Asistant:      Consent:  Verbal consent obtained. Written consent obtained. After informed consent & appropriate time out protocol was noted & obtained. Risks/Benefits/Alternatives of the procedure were discussed including: infection, bleeding, pain, & damage to underlying organs. Procedure Details:  Patient understanding: patient states understanding of the procedure being performed. Time out: Immediately prior to procedure a \"time out\" was called to verify the correct. Patient was made up in sitting position and ultrasound was done and site of maximum fluid collection was marked. Preparation: Patient was prepped and draped in the usual sterile fashion. Local anesthetic: Lidocaine without epinephrine, amount varied for local control. Technique:  The patient was positioned appropriately for chest tube placement. The patients right  chest was prepped and draped in sterile fashion. Lidocaine was used to anesthetize the surrounding skin area. A small skin incision was made in the laterally in the midaxillary line. at the inframammarycrease. using cathter over needle adjacent to the superior rib. 14 f R PIGTAIL cathter was inserted over needle   Then later we drain about *0 cc of  fluid   Complications:                  None; patient tolerated the procedure well. .                                                 None     Estimated blood loss:      Minimal     Condition:                          stable     Plan:   1. Check a post procedure film.    2. Review testing when available.   Monitor output and airleak

## 2020-09-30 NOTE — ANESTHESIA POSTPROCEDURE EVALUATION
Department of Anesthesiology  Postprocedure Note    Patient: Arsenio Almodovar  MRN: [id number]  YOB: 1950  Date of evaluation: 9/30/2020  Time:  11:20 AM     Procedure Summary     Date:  09/29/20 Room / Location:  [facility]    Anesthesia Start:  0472 Anesthesia Stop:  1255    Procedure:  PACEMAKER GENERATOR CHANGE W ANESTHESIA Diagnosis:       Bradycardia, unspecified      Presence of cardiac pacemaker      S/P placement of cardiac pacemaker    Scheduled Providers:  MARGUERITE Vazquez - CRNA; Cheri Costa MD Responsible Provider:  Cheri Costa MD    Anesthesia Type:  MAC ASA Status:  3          Anesthesia Type: MAC    Bhupinder Phase I:      Bhupinder Phase II:      Last vitals: Reviewed and per EMR flowsheets.        Anesthesia Post Evaluation    Patient location during evaluation: bedside  Patient participation: complete - patient participated  Level of consciousness: awake and alert  Airway patency: patent  Nausea & Vomiting: no nausea and no vomiting  Complications: no  Cardiovascular status: blood pressure returned to baseline and hemodynamically stable  Respiratory status: acceptable and spontaneous ventilation  Hydration status: euvolemic

## 2020-10-01 ENCOUNTER — APPOINTMENT (OUTPATIENT)
Dept: GENERAL RADIOLOGY | Age: 70
DRG: 242 | End: 2020-10-01
Attending: INTERNAL MEDICINE
Payer: MEDICARE

## 2020-10-01 PROBLEM — J93.9 PNEUMOTHORAX: Status: ACTIVE | Noted: 2020-10-01

## 2020-10-01 LAB
ANION GAP SERPL CALCULATED.3IONS-SCNC: 15 MMOL/L (ref 7–16)
BUN BLDV-MCNC: 8 MG/DL (ref 8–23)
CALCIUM SERPL-MCNC: 9 MG/DL (ref 8.6–10.2)
CHLORIDE BLD-SCNC: 105 MMOL/L (ref 98–107)
CO2: 23 MMOL/L (ref 22–29)
CREAT SERPL-MCNC: 0.6 MG/DL (ref 0.5–1)
GFR AFRICAN AMERICAN: >60
GFR NON-AFRICAN AMERICAN: >60 ML/MIN/1.73
GLUCOSE BLD-MCNC: 140 MG/DL (ref 74–99)
HCT VFR BLD CALC: 39.7 % (ref 34–48)
HEMOGLOBIN: 12.8 G/DL (ref 11.5–15.5)
MAGNESIUM: 2.2 MG/DL (ref 1.6–2.6)
MCH RBC QN AUTO: 30.4 PG (ref 26–35)
MCHC RBC AUTO-ENTMCNC: 32.2 % (ref 32–34.5)
MCV RBC AUTO: 94.3 FL (ref 80–99.9)
PDW BLD-RTO: 12.9 FL (ref 11.5–15)
PLATELET # BLD: 246 E9/L (ref 130–450)
PMV BLD AUTO: 10.2 FL (ref 7–12)
POTASSIUM SERPL-SCNC: 3.4 MMOL/L (ref 3.5–5)
RBC # BLD: 4.21 E12/L (ref 3.5–5.5)
SODIUM BLD-SCNC: 143 MMOL/L (ref 132–146)
WBC # BLD: 7.3 E9/L (ref 4.5–11.5)

## 2020-10-01 PROCEDURE — 6370000000 HC RX 637 (ALT 250 FOR IP): Performed by: INTERNAL MEDICINE

## 2020-10-01 PROCEDURE — 2580000003 HC RX 258: Performed by: INTERNAL MEDICINE

## 2020-10-01 PROCEDURE — 71045 X-RAY EXAM CHEST 1 VIEW: CPT

## 2020-10-01 PROCEDURE — 6360000002 HC RX W HCPCS: Performed by: INTERNAL MEDICINE

## 2020-10-01 PROCEDURE — 99233 SBSQ HOSP IP/OBS HIGH 50: CPT | Performed by: INTERNAL MEDICINE

## 2020-10-01 PROCEDURE — 2060000000 HC ICU INTERMEDIATE R&B

## 2020-10-01 PROCEDURE — 6370000000 HC RX 637 (ALT 250 FOR IP): Performed by: NURSE PRACTITIONER

## 2020-10-01 PROCEDURE — 85027 COMPLETE CBC AUTOMATED: CPT

## 2020-10-01 PROCEDURE — 36415 COLL VENOUS BLD VENIPUNCTURE: CPT

## 2020-10-01 PROCEDURE — 94640 AIRWAY INHALATION TREATMENT: CPT

## 2020-10-01 PROCEDURE — 99024 POSTOP FOLLOW-UP VISIT: CPT | Performed by: INTERNAL MEDICINE

## 2020-10-01 PROCEDURE — 80048 BASIC METABOLIC PNL TOTAL CA: CPT

## 2020-10-01 PROCEDURE — 2700000000 HC OXYGEN THERAPY PER DAY

## 2020-10-01 PROCEDURE — 83735 ASSAY OF MAGNESIUM: CPT

## 2020-10-01 RX ORDER — POTASSIUM CHLORIDE 20 MEQ/1
20 TABLET, EXTENDED RELEASE ORAL ONCE
Status: COMPLETED | OUTPATIENT
Start: 2020-10-01 | End: 2020-10-01

## 2020-10-01 RX ORDER — IPRATROPIUM BROMIDE AND ALBUTEROL SULFATE 2.5; .5 MG/3ML; MG/3ML
1 SOLUTION RESPIRATORY (INHALATION)
Status: DISCONTINUED | OUTPATIENT
Start: 2020-10-01 | End: 2020-10-06 | Stop reason: HOSPADM

## 2020-10-01 RX ADMIN — Medication 10 ML: at 21:38

## 2020-10-01 RX ADMIN — POTASSIUM CHLORIDE 20 MEQ: 1500 TABLET, EXTENDED RELEASE ORAL at 15:50

## 2020-10-01 RX ADMIN — LORAZEPAM 1 MG: 1 TABLET ORAL at 21:38

## 2020-10-01 RX ADMIN — PIOGLITAZONE 15 MG: 15 TABLET ORAL at 09:25

## 2020-10-01 RX ADMIN — FENTANYL CITRATE 25 MCG: 0.05 INJECTION, SOLUTION INTRAMUSCULAR; INTRAVENOUS at 02:22

## 2020-10-01 RX ADMIN — IPRATROPIUM BROMIDE AND ALBUTEROL SULFATE 1 AMPULE: .5; 3 SOLUTION RESPIRATORY (INHALATION) at 20:00

## 2020-10-01 RX ADMIN — FENTANYL CITRATE 25 MCG: 0.05 INJECTION, SOLUTION INTRAMUSCULAR; INTRAVENOUS at 04:17

## 2020-10-01 RX ADMIN — SIMVASTATIN 10 MG: 10 TABLET, FILM COATED ORAL at 21:38

## 2020-10-01 RX ADMIN — LORAZEPAM 1 MG: 1 TABLET ORAL at 15:50

## 2020-10-01 RX ADMIN — Medication 10 ML: at 09:26

## 2020-10-01 RX ADMIN — IPRATROPIUM BROMIDE AND ALBUTEROL SULFATE 1 AMPULE: .5; 3 SOLUTION RESPIRATORY (INHALATION) at 19:44

## 2020-10-01 RX ADMIN — FUROSEMIDE 20 MG: 20 TABLET ORAL at 09:25

## 2020-10-01 RX ADMIN — FENTANYL CITRATE 25 MCG: 0.05 INJECTION, SOLUTION INTRAMUSCULAR; INTRAVENOUS at 05:29

## 2020-10-01 RX ADMIN — ACETAMINOPHEN 650 MG: 325 TABLET, FILM COATED ORAL at 09:25

## 2020-10-01 RX ADMIN — MULTIPLE VITAMINS W/ MINERALS TAB 1 TABLET: TAB at 09:25

## 2020-10-01 RX ADMIN — LORATADINE 10 MG: 10 TABLET ORAL at 09:28

## 2020-10-01 RX ADMIN — FENTANYL CITRATE 25 MCG: 0.05 INJECTION, SOLUTION INTRAMUSCULAR; INTRAVENOUS at 12:26

## 2020-10-01 RX ADMIN — LORAZEPAM 1 MG: 1 TABLET ORAL at 09:25

## 2020-10-01 ASSESSMENT — PAIN SCALES - GENERAL
PAINLEVEL_OUTOF10: 4
PAINLEVEL_OUTOF10: 0
PAINLEVEL_OUTOF10: 4
PAINLEVEL_OUTOF10: 8
PAINLEVEL_OUTOF10: 2

## 2020-10-01 ASSESSMENT — PAIN DESCRIPTION - LOCATION: LOCATION: FLANK

## 2020-10-01 ASSESSMENT — PAIN DESCRIPTION - PAIN TYPE
TYPE: ACUTE PAIN
TYPE: ACUTE PAIN;SURGICAL PAIN
TYPE: ACUTE PAIN

## 2020-10-01 ASSESSMENT — PAIN DESCRIPTION - PROGRESSION: CLINICAL_PROGRESSION: GRADUALLY WORSENING

## 2020-10-01 ASSESSMENT — PAIN DESCRIPTION - ORIENTATION: ORIENTATION: RIGHT

## 2020-10-01 NOTE — PROGRESS NOTES
"Assumed care at 1900. Received report from RN. Patient is AOx3 disoriented to time. Assessment complete. Labs reviewed. Patient and RN discussed plan of care. Patient questions answered. Patient needs are met at this time. Bed in lowest and locked position. Call light is within reach. Hourly rounding in place. /51   Pulse 63   Temp 36.2 °C (97.1 °F)   Resp 17   Ht 1.6 m (5' 2.99\")   Wt 83.4 kg (183 lb 13.8 oz)   SpO2 90%   Breastfeeding? No   BMI 32.58 kg/m²       " Cardiac Electrophysiology Inpatient Progress Note    Fiona Ricci  1950  Date of Service: 10/1/2020  PCP: MD Larry Posada MD  Electrophysiologist: Chidi Aocsta MD        Subjective: Fiona Ricci is seen in hospital follow-up and management of: CRT-P generator replacement and new right atrial pacemaker lead. 09/30/20: Fiona Ricci is seen in hospital follow-up she under went generator replacement and placement of a new right atrial lead  With the capping and abandonment of her prior atrial lead. This morning she had decreased breath sounds and was tachycardiac. A chest X ray showed a large right sided pneumothorax with slight mediastinal shift to the left. Dr. Shelby Romero was consulted and urgently placed a right sided chest tube/pig tail cathter post placement of the chest tube aidee reported improved respirations. Her device function is normal on post op day 1, her dressing is clean dry and intact the device site is without edema or ecchymosis. 10/1/2020: Fiona Ricci is seen in hospital follow-up, she remains AS-VS. She complains of right sided chest pain and notes improved dyspnea. Her chest tube is now to water seal and the AM chest X ray showed no Pneumothorax.      Patient Active Problem List   Diagnosis    H/O blood clots    Sick sinus syndrome (HCC)    Sinus arrest    Heart murmur    Status post placement of cardiac pacemaker    Dyslipidemia    Controlled type 2 diabetes mellitus with complication, without long-term current use of insulin (HCC)    S/P placement of cardiac pacemaker    Elective replacement indicated for cardiac pacemaker battery at end of lifespan         Scheduled Medications:   [Held by provider] aspirin  81 mg Oral Daily    LORazepam  1 mg Oral TID    loratadine  10 mg Oral Daily    therapeutic multivitamin-minerals  1 tablet Oral Daily    pioglitazone  15 mg Oral Daily    simvastatin  10 mg Oral Nightly    furosemide  20 mg Oral Daily    sodium chloride flush  10 mL Intravenous 2 times per day       Infusion Medications:   sodium chloride 20 mL/hr at 09/30/20 1736       PRN Medications:  fentanNYL, sodium chloride flush, acetaminophen    Allergies   Allergen Reactions    Cefuroxime Axetil     Codeine     Haldol [Haloperidol]     Rocephin [Ceftriaxone Sodium-Lidocaine]        Wt Readings from Last 3 Encounters:   09/29/20 190 lb (86.2 kg)   06/30/20 195 lb (88.5 kg)   11/12/19 185 lb (83.9 kg)     Temp Readings from Last 3 Encounters:   10/01/20 97.6 °F (36.4 °C) (Temporal)   08/17/15 97.7 °F (36.5 °C)   03/12/14 98.2 °F (36.8 °C)     BP Readings from Last 3 Encounters:   10/01/20 114/69   09/29/20 93/78   06/30/20 128/76     Pulse Readings from Last 3 Encounters:   10/01/20 80   06/30/20 98   11/12/19 91         Intake/Output Summary (Last 24 hours) at 10/1/2020 1038  Last data filed at 10/1/2020 0853  Gross per 24 hour   Intake 360 ml   Output 3 ml   Net 357 ml       ROS:   Constitutional: Negative for fever, activity change and appetite change. HENT: Negative for epistaxis. Eyes: Negative for diploplia, blurred vision. Respiratory: Negative for cough, chest tightness, shortness of breath and wheezing. Cardiovascular: pertinent positives in HPI  Gastrointestinal: Negative for abdominal pain and blood in stool. All other review of systems are negative     PHYSICAL EXAM:  Vitals:    09/30/20 1732 09/30/20 1745 09/30/20 2245 10/01/20 0845   BP:  (!) 141/82 132/71 114/69   Pulse:  84 86 80   Resp:  16 16 19   Temp:  98 °F (36.7 °C) 98 °F (36.7 °C) 97.6 °F (36.4 °C)   TempSrc:  Temporal Temporal Temporal   SpO2: 97%  96% 93%   Weight:       Height:         Constitutional: Well-developed, Improved dyspnea   Eyes: conjunctivae normal, no xanthelasma   Ears, Nose, Throat: oral mucosa moist, no cyanosis   CV: no JVD. Regular rate and rhythm. Normal S1S2 and no S3. No murmurs, rubs, or gallops.  PMI is nondisplaced  Lungs Normal breath sounds, normal respiratory effort without used of accessory muscles  Abdomen: soft, non-tender, bowel sounds present, no masses or hepatomegaly   Musculoskeletal: no digital clubbing, no edema   Skin: warm, no rashes     Pertinent Labs:  CBC:   Recent Labs     20  0755 20  0522   WBC 7.5 9.5   HGB 15.0 13.3   HCT 44.3 40.6    239     BMP:  Recent Labs     20  0755 20  0522    140   K 3.7 3.4*    103   CO2 25 23   BUN 11 11   CREATININE 0.7 0.6   CALCIUM 9.2 8.7     ABGs: No results found for: PHART, PO2ART, WOS9BFB  INR: No results for input(s): INR in the last 72 hours. BNP: No results for input(s): BNP in the last 72 hours. TSH:   Lab Results   Component Value Date    TSH 2.395 2013      Cardiac Injury Profile: No results for input(s): CKTOTAL, CKMB, CKMBINDEX, TROPONINI in the last 72 hours. Lipid Profile:   Lab Results   Component Value Date    TRIG 152 2014    HDL 31 2014    LDLCALC 110 2014    CHOL 171 2014      Hemoglobin A1C: No components found for: HGBA1C   Xray: Xr Chest Portable    Result Date: 2020  Patient MRN: [id number] : 1950 Age:  78 years Gender: Female Order Date: 2020 11:20 AM Exam: XR CHEST PORTABLE Number of Images: 1 view Indication:  Follow-up right pneumothorax after placement of pigtail chest drain. Comparison: 2020, 0811 hours. FINDINGS: There is now a pigtail chest drain present on the right. The right pneumothorax has resolved. The transvenous pacemaker is stable. There are no new abnormal findings. Resolution of right pneumothorax after placement of chest drain. Xr Chest Portable    Result Date: 2020  Patient MRN:  [id number] : 1950 Age: 78 years Gender: Female Order Date:  2020 8:00 AM EXAM: XR CHEST PORTABLE INDICATION:  post  pacemaker post  pacemaker COMPARISON: 2020 FINDINGS:  There is been placement of a right-sided pacer.  There is a large right-sided pneumothorax with questionable slight mediastinal shift to the left. Heart size is normal. Left lung is clear. Large right-sided pneumothorax with suggestion of slight mediastinal shift to the left CRITICAL FINDING: Results were called to Dr. Martinez Catherine on 2010 at 0930 hours, at time of image presentation     Xr Chest Portable    Result Date: 2020  Patient MRN: [id number] : 1950 Age:  78 years Gender: Female Order Date: 2020 1:12 PM Exam: XR CHEST PORTABLE Number of Images: 1 view Indication:   Pacemaker placement and rule out pneumothorax Pacemaker placement and rule out pneumothorax Comparison: Prior study from 2010 is available. Findings: The lungs are clear. There is no evidence of pulmonary infiltrate or pleural effusion. The pulmonary vascularity is unremarkable. The cardiac, hilar and mediastinal silhouettes are satisfactory. There is a right-sided pacemaker with lead in right atrium and right ventricle. There is uncoiling and atherosclerotic change of thoracic aorta. The bony thorax demonstrates no gross abnormality. The study is unchanged from prior study. NO ACUTE CARDIOPULMONARY PROCESS         Pertinent Cardiac Testing:    Echo 20  Findings      Left Ventricle   Normal left ventricular chamber size. Normal left ventricular systolic function. Visually estimated LVEF is 55-60%. Stage I diastolic dysfunction. Normal left atrial pressure. Right Ventricle   Normal right ventricle size and function. Pacer lead in RV. Left Atrium   Left atrium is of normal size. Interatrial septum not well visualized but appears intact. Right Atrium   Normal right atrium. Mitral Valve   Normal mitral valve structure and function. No mitral valve prolapse. Tricuspid Valve   Normal tricuspid valve structure. There is trace tricuspid regurgitation, RVSP 24mmHg. Aortic Valve   The aortic valve appears mildly sclerotic.    No hemodynamically significant aortic stenosis is present. Pulmonic Valve   The pulmonic valve was not well visualized. Pericardial Effusion   No evidence of pericardial effusion. Pericardium appears normal.      Pleural Effusion   No evidence of pleural effusion. Aorta   Normal aortic root size and ascending aorta. Miscellaneous   The inferior vena cava diameter is normal with normal respiratory   variation. Conclusions      Summary   Normal left ventricular chamber size. Normal left ventricular systolic function, LVEF is 55-60%. Stage I diastolic dysfunction. Normal right ventricle size and function. Pacer lead in RV. The aortic valve appears mildly sclerotic. No hemodynamically significant aortic stenosis is present. There is trace tricuspid regurgitation, RVSP 24mmHg. Normal aortic root size. No evidence of pericardial effusion. Pericardium appears normal.   No intra cardiac mass or thrombus. No recent comparison study available.       Signature      ----------------------------------------------------------------   Electronically signed by Srinivasa Neville MD(Interpreting   physician) on 09/15/2020 04:59 PM   ----------------------------------------------------------------     M-Mode/2D Measurements & Calculations      LV Diastolic    LV Systolic Dimension: 3.3   AV Cusp Separation: 1.9 cmLA   Dimension: 4.6  cm                           Dimension: 3.4 cmAO Root   cm              LV Volume Diastolic: 89.7 ml Dimension: 3.3 cm   LV FS:28.3 %    LV Volume Systolic: 38.8 ml   LV PW           LV EDV/LV EDV Index: 26.1   Diastolic: 1 cm JU/92 LX/R^5WV ESV/LV ESV   Septum          Index: 43.2 ml/22ml/ m^2     RV Diastolic Dimension: 2.6   Diastolic: 0.9  EF Calculated: 55.6 %        cm   cm              LV Mass Index: 76 l/min*m^2      LV Mass: 148.1                               LA volume/Index: 37.8 ml   g               LVOT: 2.3 cm                 RA Area: 11.4 cm^2 Doppler Measurements & Calculations      MV Peak E-Wave: 0.53 AV Peak Velocity:     LVOT Peak Velocity: 0.92 m/s   m/s                  1.13 m/s              LVOT Mean Velocity: 0.55 m/s   MV Peak A-Wave: 0.67 AV Peak Gradient:     LVOT Peak Gradient: 3.4   m/s                  5.09 mmHg             mmHgLVOT Mean Gradient: 1.4   MV E/A Ratio: 0.79   AV Mean Velocity:     mmHg   MV Peak Gradient:    0.79 m/s              Estimated RVSP: 21.5 mmHg   2.1 mmHg             AV Mean Gradient: 2.1 Estimated RAP:3 mmHg   MV Mean Gradient:    mmHg   0.9 mmHg             AV VTI: 20.6 cm   MV Mean Velocity:    AV Area               TR Velocity:2.15 m/s   0.49 m/s             (Continuity):3.31     TR Gradient:18.52 mmHg   MV Deceleration      cm^2                  PV Peak Velocity: 0.8 m/s   Time: 217.3 msec                           PV Peak Gradient: 2.59 mmHg   MV P1/2t: 50.5 msec  LVOT VTI: 16.4 cm     PV Mean Velocity: 0.49 m/s   MVA by PHT:4.36 cm^2                       PV Mean Gradient: 1.2 mmHg   MV Area              Estimated PASP: 21.52   (continuity): 4.1    mmHg   cm^2     [id number]      Xspqmmswr43/1/2020:  AS-VS with a rate of 80bpm     ECG 09/29/2020: Normal sinus rhythm rate 78 bpmm QRS 94, QTc 453ms     Device Interrogation ( 09/30/20 )  Make/Model [employer] MRI   Mode DDDR 70/130  P wave: >5.0 mV  Impedance: 760 ohms   Threshold: 0.5 V @ 0.5 ms  RV R wave: 8.1 mV  Impedance: 580 ohms   Threshold: 1.25 V @ 1.0 ms  Pacing: A: <1%  RV: <1%  Battery Voltage/Longevity:  4.6-11.4 years     Arrhythmias: none   Reprogramming included none   Overall device function is normal  All device programmable settings were evaluated per above and in the scanned document, along with iterative adjustments (capture thresholds) to assess and select the most appropriate final programming to overnight. Physical exam showed no JVD, RRR, normal S1S2, no murmur, clear lungs bilaterally, no edema. No pocket hematoma. Continue chest tube drainage per Pulmonary.  Plan to discharge home after CT removal.    Azalea Jimenez MD  Cardiac Electrophysiology  [facility]) Physicians  [facility]: [facility]  1:45 PM  10/1/2020

## 2020-10-02 ENCOUNTER — APPOINTMENT (OUTPATIENT)
Dept: GENERAL RADIOLOGY | Age: 70
DRG: 242 | End: 2020-10-02
Attending: INTERNAL MEDICINE
Payer: MEDICARE

## 2020-10-02 LAB
ANION GAP SERPL CALCULATED.3IONS-SCNC: 13 MMOL/L (ref 7–16)
BUN BLDV-MCNC: 8 MG/DL (ref 8–23)
CALCIUM SERPL-MCNC: 8.6 MG/DL (ref 8.6–10.2)
CHLORIDE BLD-SCNC: 102 MMOL/L (ref 98–107)
CO2: 24 MMOL/L (ref 22–29)
CREAT SERPL-MCNC: 0.6 MG/DL (ref 0.5–1)
GFR AFRICAN AMERICAN: >60
GFR NON-AFRICAN AMERICAN: >60 ML/MIN/1.73
GLUCOSE BLD-MCNC: 109 MG/DL (ref 74–99)
MAGNESIUM: 2.1 MG/DL (ref 1.6–2.6)
POTASSIUM SERPL-SCNC: 3.6 MMOL/L (ref 3.5–5)
SODIUM BLD-SCNC: 139 MMOL/L (ref 132–146)

## 2020-10-02 PROCEDURE — 71045 X-RAY EXAM CHEST 1 VIEW: CPT

## 2020-10-02 PROCEDURE — 2700000000 HC OXYGEN THERAPY PER DAY

## 2020-10-02 PROCEDURE — 99233 SBSQ HOSP IP/OBS HIGH 50: CPT | Performed by: INTERNAL MEDICINE

## 2020-10-02 PROCEDURE — 6370000000 HC RX 637 (ALT 250 FOR IP): Performed by: INTERNAL MEDICINE

## 2020-10-02 PROCEDURE — 94640 AIRWAY INHALATION TREATMENT: CPT

## 2020-10-02 PROCEDURE — 99024 POSTOP FOLLOW-UP VISIT: CPT | Performed by: INTERNAL MEDICINE

## 2020-10-02 PROCEDURE — 83735 ASSAY OF MAGNESIUM: CPT

## 2020-10-02 PROCEDURE — 2580000003 HC RX 258: Performed by: INTERNAL MEDICINE

## 2020-10-02 PROCEDURE — 80048 BASIC METABOLIC PNL TOTAL CA: CPT

## 2020-10-02 PROCEDURE — 36415 COLL VENOUS BLD VENIPUNCTURE: CPT

## 2020-10-02 PROCEDURE — 2060000000 HC ICU INTERMEDIATE R&B

## 2020-10-02 PROCEDURE — 6360000002 HC RX W HCPCS: Performed by: INTERNAL MEDICINE

## 2020-10-02 PROCEDURE — 99222 1ST HOSP IP/OBS MODERATE 55: CPT | Performed by: THORACIC SURGERY (CARDIOTHORACIC VASCULAR SURGERY)

## 2020-10-02 RX ORDER — KETOROLAC TROMETHAMINE 30 MG/ML
15 INJECTION, SOLUTION INTRAMUSCULAR; INTRAVENOUS EVERY 6 HOURS PRN
Status: DISCONTINUED | OUTPATIENT
Start: 2020-10-02 | End: 2020-10-06 | Stop reason: HOSPADM

## 2020-10-02 RX ADMIN — FENTANYL CITRATE 25 MCG: 0.05 INJECTION, SOLUTION INTRAMUSCULAR; INTRAVENOUS at 21:48

## 2020-10-02 RX ADMIN — MULTIPLE VITAMINS W/ MINERALS TAB 1 TABLET: TAB at 09:30

## 2020-10-02 RX ADMIN — IPRATROPIUM BROMIDE AND ALBUTEROL SULFATE 1 AMPULE: .5; 3 SOLUTION RESPIRATORY (INHALATION) at 17:00

## 2020-10-02 RX ADMIN — IPRATROPIUM BROMIDE AND ALBUTEROL SULFATE 1 AMPULE: .5; 3 SOLUTION RESPIRATORY (INHALATION) at 08:18

## 2020-10-02 RX ADMIN — FENTANYL CITRATE 25 MCG: 0.05 INJECTION, SOLUTION INTRAMUSCULAR; INTRAVENOUS at 12:23

## 2020-10-02 RX ADMIN — KETOROLAC TROMETHAMINE 15 MG: 30 INJECTION, SOLUTION INTRAMUSCULAR; INTRAVENOUS at 16:06

## 2020-10-02 RX ADMIN — LORAZEPAM 1 MG: 1 TABLET ORAL at 21:48

## 2020-10-02 RX ADMIN — FUROSEMIDE 20 MG: 20 TABLET ORAL at 09:30

## 2020-10-02 RX ADMIN — IPRATROPIUM BROMIDE AND ALBUTEROL SULFATE 1 AMPULE: .5; 3 SOLUTION RESPIRATORY (INHALATION) at 21:01

## 2020-10-02 RX ADMIN — LORATADINE 10 MG: 10 TABLET ORAL at 09:30

## 2020-10-02 RX ADMIN — Medication 10 ML: at 10:14

## 2020-10-02 RX ADMIN — IPRATROPIUM BROMIDE AND ALBUTEROL SULFATE 1 AMPULE: .5; 3 SOLUTION RESPIRATORY (INHALATION) at 13:04

## 2020-10-02 RX ADMIN — SIMVASTATIN 10 MG: 10 TABLET, FILM COATED ORAL at 21:48

## 2020-10-02 RX ADMIN — PIOGLITAZONE 15 MG: 15 TABLET ORAL at 09:30

## 2020-10-02 RX ADMIN — LORAZEPAM 1 MG: 1 TABLET ORAL at 09:30

## 2020-10-02 RX ADMIN — ACETAMINOPHEN 650 MG: 325 TABLET, FILM COATED ORAL at 10:11

## 2020-10-02 RX ADMIN — LORAZEPAM 1 MG: 1 TABLET ORAL at 14:00

## 2020-10-02 RX ADMIN — ACETAMINOPHEN 650 MG: 325 TABLET, FILM COATED ORAL at 00:37

## 2020-10-02 ASSESSMENT — PAIN DESCRIPTION - LOCATION
LOCATION: BACK
LOCATION: FLANK

## 2020-10-02 ASSESSMENT — PAIN SCALES - GENERAL
PAINLEVEL_OUTOF10: 10
PAINLEVEL_OUTOF10: 9
PAINLEVEL_OUTOF10: 2
PAINLEVEL_OUTOF10: 3
PAINLEVEL_OUTOF10: 9
PAINLEVEL_OUTOF10: 10
PAINLEVEL_OUTOF10: 2

## 2020-10-02 ASSESSMENT — PAIN DESCRIPTION - ORIENTATION
ORIENTATION: RIGHT
ORIENTATION: RIGHT

## 2020-10-02 ASSESSMENT — PAIN DESCRIPTION - PROGRESSION
CLINICAL_PROGRESSION: GRADUALLY WORSENING
CLINICAL_PROGRESSION: GRADUALLY WORSENING

## 2020-10-02 ASSESSMENT — PAIN DESCRIPTION - PAIN TYPE
TYPE: ACUTE PAIN;SURGICAL PAIN
TYPE: ACUTE PAIN

## 2020-10-02 NOTE — PROGRESS NOTES
Messaged Dr. Soni Six regarding arm immobilizer. Per Dr. Marizol Bee to remove arm immobilizer, \"do not lift arm above shoulder. \"

## 2020-10-02 NOTE — CONSULTS
CTS Consult    Patient name: Reed Severance    Reason for consult: PTX    Referring Physician: Dr. Madonna Degroot    Primary Care Physician: Becca Guy MD    Date of service: 10/2/2020    Chief Complaint: SOB    HPI: 78year old female who was admitted after elective pacer placement with a tension PTX. She had a CT placed and improved. It was placed to water seal and she developed sub q emphysema. Currently she denies CP, SOB, KELLY, LE edema, F/C, N/V, orthopnea, PND and syncope. Allergies:    Allergies   Allergen Reactions    Cefuroxime Axetil     Codeine     Haldol [Haloperidol]     Rocephin [Ceftriaxone Sodium-Lidocaine]        Home medications:    Current Facility-Administered Medications   Medication Dose Route Frequency Provider Last Rate Last Dose    ipratropium-albuterol (DUONEB) nebulizer solution 1 ampule  1 ampule Inhalation Q4H JAMES Hauser, DO   1 ampule at 10/02/20 0818    fentaNYL (SUBLIMAZE) injection 25 mcg  25 mcg Intravenous Q1H PRN Zach Francis MD   25 mcg at 10/01/20 1226    0.9 % sodium chloride infusion   Intravenous Continuous Yesika Woodard MD 20 mL/hr at 09/30/20 1736      [Held by provider] aspirin chewable tablet 81 mg  81 mg Oral Daily Yesika Woodard MD   81 mg at 09/30/20 1032    LORazepam (ATIVAN) tablet 1 mg  1 mg Oral TID Zach Francis MD   1 mg at 10/02/20 0930    loratadine (CLARITIN) tablet 10 mg  10 mg Oral Daily Yesika Woodard MD   10 mg at 10/02/20 0930    therapeutic multivitamin-minerals 1 tablet  1 tablet Oral Daily Zach Francis MD   1 tablet at 10/02/20 0930    pioglitazone (ACTOS) tablet 15 mg  15 mg Oral Daily Yesika Woodard MD   15 mg at 10/02/20 0930    simvastatin (ZOCOR) tablet 10 mg  10 mg Oral Nightly Zach Francis MD   10 mg at 10/01/20 2138    furosemide (LASIX) tablet 20 mg  20 mg Oral Daily Yesika Woodard MD   20 mg at 10/02/20 0930    sodium chloride flush 0.9 % injection 10 mL 10 mL Intravenous 2 times per day Jono Wang MD   10 mL at 10/02/20 1014    sodium chloride flush 0.9 % injection 10 mL  10 mL Intravenous PRN Yesika Woodard MD   10 mL at 09/30/20 1733    acetaminophen (TYLENOL) tablet 650 mg  650 mg Oral Q4H PRN Jono Wang MD   650 mg at 10/02/20 1011       Past Medical History:  Past Medical History:   Diagnosis Date    Asthma     Bleeding ulcer     diagnosed at age 17    Diabetes mellitus ([facility] [address]     H/O blood clots may 1994    around pacemaker site, shoulder and arm    Sick sinus syndrome ([facility] [address]     Sinus arrest february 1994    requiring insertion of permanent pacemaker       Past Surgical History:  Past Surgical History:   Procedure Laterality Date    APPENDECTOMY      BREAST BIOPSY Right 05/2019    BREAST LUMPECTOMY      3 benign lumps removed from left breast   [facility]    dr Clarence Arshad for sick sinus syndrome sinus arrest    CARDIAC PACEMAKER PLACEMENT  2010    battery depletion of permanent pacemaker with replacement    CARDIAC PACEMAKER PLACEMENT  2000    PGR Pacesetter    CYST REMOVAL      cysts removed from hand, hip and foot    DIAGNOSTIC CARDIAC CATH LAB PROCEDURE  1/16/1990    [facility] normal coronaries, normal cath per PAW    DIAGNOSTIC CARDIAC CATH LAB PROCEDURE      mild dilated cardiomyopathy    EYE SURGERY Bilateral 06/2019    laser Sx (relieve pressure)    FOOT SURGERY  december 2004    broken right foot with removal of hematoma by dr Matthew Laguna, [facility]  july 2006    left mastectomy for cancer    PACEMAKER CHANGE  9/29/30    PGR & NEW RA LEAD      ([facility])    DR. Mayra Martines     TONSILLECTOMY         Social History:  Social History     Socioeconomic History    Marital status:      Spouse name: Not on file    Number of children: Not on file    Years of education: Not on file    Highest education level: Not on file   Occupational History    Not on file   Social Needs    Financial resource strain: Not on file    Food insecurity     Worry: Not on file     Inability: Not on file    Transportation needs     Medical: Not on file     Non-medical: Not on file   Tobacco Use    Smoking status: Former Smoker     Packs/day: 0.50     Years: 20.00     Pack years: 10.00     Last attempt to quit: 2001     Years since quittin.7    Smokeless tobacco: Never Used    Tobacco comment: quit in    Substance and Sexual Activity    Alcohol use: No     Comment: daily pop    Drug use: No    Sexual activity: Not on file   Lifestyle    Physical activity     Days per week: Not on file     Minutes per session: Not on file    Stress: Not on file   Relationships    Social connections     Talks on phone: Not on file     Gets together: Not on file     Attends Confucianism service: Not on file     Active member of club or organization: Not on file     Attends meetings of clubs or organizations: Not on file     Relationship status: Not on file    Intimate partner violence     Fear of current or ex partner: Not on file     Emotionally abused: Not on file     Physically abused: Not on file     Forced sexual activity: Not on file   Other Topics Concern    Not on file   Social History Narrative    Not on file       Family History:  History reviewed. No pertinent family history. Review of Systems:  Constitutional: Denies fevers, chills, or weight loss. HEENT: Denies visual changes or hearing loss. Heart: As per HPI. Lungs: Denies shortness of breath, cough, or wheezing. Gastrointestinal: Denies nausea, vomiting, constipation, or diarrhea. Genitourinary: dysuria or hematuria. Psychiatric: Patient denies anxiety or depression. Neurologic: Patient denies weakness of the extremities, dizziness, or headaches. All other ROS checked and found to be negative.     Labs:  Recent Labs     20  0522 10/01/20  1302   WBC 9.5 7.3   HGB 13.3 12.8   HCT 40.6 39.7    246 Recent Labs     09/30/20  0522 10/01/20  1302 10/02/20  0540   BUN 11 8 8   CREATININE 0.6 0.6 0.6       Objective:  Vitals /76   Pulse 92   Temp 97.1 °F (36.2 °C) (Temporal)   Resp 19   Ht 5' 5\" (1.651 m)   Wt 190 lb (86.2 kg)   SpO2 92%   BMI 31.62 kg/m²   General Appearance: Pleasant 78y.o. year old female who appears stated age. Communicates well, no acute distress. HEENT: Head is normocephalic, atraumatic. EOMs intact, PERRL. Trachea midline. Lungs: Normal respiratory rate and normal effort. She is not in respiratory distress. Breath sounds clear to auscultation. No wheezes. Heart: Normal rate. Regular rhythm. S1 normal and S2 normal. Positive for murmur. Chest: Symmetric chest wall expansion. Right chest incision dressed  Extremities: Normal range of motion. Neurological: Patient is alert and oriented to person, place and time. Patient has normal reflexes. Skin: Warm and dry. Abdomen: Abdomen is soft and non-distended. Bowel sounds are normal. There is no abdominal tenderness tenderness. There is no guarding. There is no mass. Pulses: Distal pulses are intact. Skin: Warm and dry without lesions. Assessment: PTX        Plan: Place tube back to suction. Hope to walk it down if possible. If not sealed by Monday then VATS with pleurodesis.     Electronically signed by Cecilia Suarez MD on 10/2/2020 at 10:52 AM

## 2020-10-02 NOTE — PROGRESS NOTES
Pulmonary [address] [facility]                             Pulmonary Consult/Progress Note :        Reason for Consultation: Tension pneumothorax  CC : Worsening shortness of breath  HPI:   Doing much better  No CP  NO SOB   No air leak       PHYSICAL EXAMINATION:     VITAL SIGNS:  BP (!) 141/89   Pulse 91   Temp 97.3 °F (36.3 °C) (Temporal)   Resp 16   Ht 5' 5\" (1.651 m)   Wt 190 lb (86.2 kg)   SpO2 94%   BMI 31.62 kg/m²   Wt Readings from Last 3 Encounters:   09/29/20 190 lb (86.2 kg)   06/30/20 195 lb (88.5 kg)   11/12/19 185 lb (83.9 kg)     Temp Readings from Last 3 Encounters:   10/01/20 97.3 °F (36.3 °C) (Temporal)   08/17/15 97.7 °F (36.5 °C)   03/12/14 98.2 °F (36.8 °C)     TMAX:  BP Readings from Last 3 Encounters:   10/01/20 (!) 141/89   09/29/20 93/78   06/30/20 128/76     Pulse Readings from Last 3 Encounters:   10/01/20 91   06/30/20 98   11/12/19 91           INTAKE/OUTPUTS:  I/O last 3 completed shifts:   In: 360 [P.O.:360]  Out: 3 [Urine:2; Emesis/NG output:1]    Intake/Output Summary (Last 24 hours) at 10/1/2020 2036  Last data filed at 10/1/2020 0853  Gross per 24 hour   Intake 360 ml   Output 3 ml   Net 357 ml       General Appearance: alert and oriented to person, place and time, well-developed and   well-nourished, in no acute distress   Eyes: pupils equal, round, and reactive to light, extraocular eye movements intact, conjunctivae normal and sclera anicteric   Neck: neck supple and non tender without mass, no thyromegaly, no thyroid nodules and no cervical adenopathy   Pulmonary/Chest: Decreased breath sound in the right side  Cardiovascular: normal rate, regular rhythm, normal S1 and S2, no murmurs, rubs, clicks or gallops, distal pulses intact, no carotid bruits, no murmurs, no gallops, no carotid bruits and no JVD   Abdomen: obese, soft, non-tender, non-distended, normal bowel sounds, no masses or organomegaly   Extremities: No edema or placement of cardiac pacemaker    Elective replacement indicated for cardiac pacemaker battery at end of lifespan    Pneumothorax               ASSESSMENT:  1.) Tension pneumothorax  2.)Possible COPD  3.)S/P Pacer         PLAN:  Clamp Chest tube ,CXR in 4 h and 8 h   If no No pneumothorax   To remove chest tube in am and discharge after observe 2-3 h post removal         Thank you very much for allowing me to participate in the care of this pleasant patient , should you have any questions ,please do not hesitate to contact me      Leticia Wilks MD,[facility]  Pulmonary&Critical Care Medicine   Director of [address] [place] [address] Director of [address] [place] [address]    Jason Cunha    NOTE: This report was transcribed using voice recognition software. Every effort was made to ensure accuracy; however, inadvertent computerized transcription errors may be present.

## 2020-10-02 NOTE — PROGRESS NOTES
Cardiac Electrophysiology Inpatient Progress Note    Radha Mason  1950  Date of Service: 10/2/2020  PCP: MD Deng Licea MD  Electrophysiologist: Nahed Lopes MD        Subjective: Radha Mason is seen in hospital follow-up and management of: CRT-P generator replacement and new right atrial pacemaker lead. 09/30/20: Radha Mason is seen in hospital follow-up she under went generator replacement and placement of a new right atrial lead  With the capping and abandonment of her prior atrial lead. This morning she had decreased breath sounds and was tachycardiac. A chest X ray showed a large right sided pneumothorax with slight mediastinal shift to the left. Dr. Ananth Vaca was consulted and urgently placed a right sided chest tube/pig tail cathter post placement of the chest tube aidee reported improved respirations. Her device function is normal on post op day 1, her dressing is clean dry and intact the device site is without edema or ecchymosis. 10/1/2020: Radha Mason is seen in hospital follow-up, she remains AS-VS. She complains of right sided chest pain and notes improved dyspnea. Her chest tube is now to water seal and the AM chest X ray showed no Pneumothorax. 10/02/2020: Radha Mason us seen in hospital follow-up, her chest tube was clamped with a repeat chest xray showing significant Sub Q emphysema and possible small apical pneumothorax. She complaints of only mild dyspnea and right sided chest pain near the chest tube insertion site.      Patient Active Problem List   Diagnosis    H/O blood clots    Sick sinus syndrome (HCC)    Sinus arrest    Heart murmur    Status post placement of cardiac pacemaker    Dyslipidemia    Controlled type 2 diabetes mellitus with complication, without long-term current use of insulin (HCC)    S/P placement of cardiac pacemaker    Elective replacement indicated for cardiac pacemaker battery at end of lifespan    Pneumothorax         Scheduled Medications:   ipratropium-albuterol  1 ampule Inhalation Q4H WA    [Held by provider] aspirin  81 mg Oral Daily    LORazepam  1 mg Oral TID    loratadine  10 mg Oral Daily    therapeutic multivitamin-minerals  1 tablet Oral Daily    pioglitazone  15 mg Oral Daily    simvastatin  10 mg Oral Nightly    furosemide  20 mg Oral Daily    sodium chloride flush  10 mL Intravenous 2 times per day       Infusion Medications:   sodium chloride 20 mL/hr at 09/30/20 1736       PRN Medications:  fentanNYL, sodium chloride flush, acetaminophen    Allergies   Allergen Reactions    Cefuroxime Axetil     Codeine     Haldol [Haloperidol]     Rocephin [Ceftriaxone Sodium-Lidocaine]        Wt Readings from Last 3 Encounters:   09/29/20 190 lb (86.2 kg)   06/30/20 195 lb (88.5 kg)   11/12/19 185 lb (83.9 kg)     Temp Readings from Last 3 Encounters:   10/02/20 97.1 °F (36.2 °C) (Temporal)   08/17/15 97.7 °F (36.5 °C)   03/12/14 98.2 °F (36.8 °C)     BP Readings from Last 3 Encounters:   10/02/20 126/76   09/29/20 93/78   06/30/20 128/76     Pulse Readings from Last 3 Encounters:   10/02/20 92   06/30/20 98   11/12/19 91         Intake/Output Summary (Last 24 hours) at 10/2/2020 1101  Last data filed at 10/2/2020 1019  Gross per 24 hour   Intake 480 ml   Output 950 ml   Net -470 ml       ROS:   Constitutional: Negative for fever, activity change and appetite change. HENT: Negative for epistaxis. Eyes: Negative for diploplia, blurred vision. Respiratory: Negative for cough, chest tightness,  and wheezing. + for shortness of breath   Cardiovascular: pertinent positives in HPI  Gastrointestinal: Negative for abdominal pain and blood in stool.    All other review of systems are negative     PHYSICAL EXAM:  Vitals:    10/01/20 1944 10/01/20 2130 10/02/20 0819 10/02/20 0915   BP:  (!) 148/72  126/76   Pulse:  86  92   Resp:  16  19   Temp:  97.5 °F (36.4 °C)  97.1 °F (36.2 °C)   TempSrc: Temporal  Temporal   SpO2: 95% 94% 93% 92%   Weight:       Height:         Constitutional: Well-developed, Improved dyspnea   Eyes: conjunctivae normal, no xanthelasma   Ears, Nose, Throat: oral mucosa moist, no cyanosis   CV: no JVD. Regular rate and rhythm. Normal S1S2 and no S3. No murmurs, rubs, or gallops. PMI is nondisplaced  Lungs decreased breath sounds, normal respiratory effort without used of accessory muscles Subcutaneous crepitus R sided   Abdomen: soft, non-tender, bowel sounds present, no masses or hepatomegaly   Musculoskeletal: no digital clubbing, no edema   Skin: warm, no rashes     Pertinent Labs:  CBC:   Recent Labs     20  0522 10/01/20  1302   WBC 9.5 7.3   HGB 13.3 12.8   HCT 40.6 39.7    246     BMP:  Recent Labs     20  0522 10/01/20  1302 10/02/20  0540    143 139   K 3.4* 3.4* 3.6    105 102   CO2 23 23 24   BUN 11 8 8   CREATININE 0.6 0.6 0.6   CALCIUM 8.7 9.0 8.6     ABGs: No results found for: PHART, PO2ART, RJS7PIX  INR: No results for input(s): INR in the last 72 hours. BNP: No results for input(s): BNP in the last 72 hours. TSH:   Lab Results   Component Value Date    TSH 2.395 2013      Cardiac Injury Profile: No results for input(s): CKTOTAL, CKMB, CKMBINDEX, TROPONINI in the last 72 hours. Lipid Profile:   Lab Results   Component Value Date    TRIG 152 2014    HDL 31 2014    LDLCALC 110 2014    CHOL 171 2014      Hemoglobin A1C: No components found for: HGBA1C   Xray: Xr Chest Portable    Result Date: 2020  Patient MRN: [id number] : 1950 Age:  78 years Gender: Female Order Date: 2020 11:20 AM Exam: XR CHEST PORTABLE Number of Images: 1 view Indication:  Follow-up right pneumothorax after placement of pigtail chest drain. Comparison: 2020, 0811 hours. FINDINGS: There is now a pigtail chest drain present on the right. The right pneumothorax has resolved.  The transvenous pacemaker is stable. There are no new abnormal findings. Resolution of right pneumothorax after placement of chest drain. Xr Chest Portable    Result Date: 2020  Patient MRN:  [id number] : 1950 Age: 78 years Gender: Female Order Date:  2020 8:00 AM EXAM: XR CHEST PORTABLE INDICATION:  post  pacemaker post  pacemaker COMPARISON: 2020 FINDINGS:  There is been placement of a right-sided pacer. There is a large right-sided pneumothorax with questionable slight mediastinal shift to the left. Heart size is normal. Left lung is clear. Large right-sided pneumothorax with suggestion of slight mediastinal shift to the left CRITICAL FINDING: Results were called to Dr. Nestor Wiley on 2010 at 0930 hours, at time of image presentation     Xr Chest Portable    Result Date: 2020  Patient MRN: [id number] : 1950 Age:  78 years Gender: Female Order Date: 2020 1:12 PM Exam: XR CHEST PORTABLE Number of Images: 1 view Indication:   Pacemaker placement and rule out pneumothorax Pacemaker placement and rule out pneumothorax Comparison: Prior study from 2010 is available. Findings: The lungs are clear. There is no evidence of pulmonary infiltrate or pleural effusion. The pulmonary vascularity is unremarkable. The cardiac, hilar and mediastinal silhouettes are satisfactory. There is a right-sided pacemaker with lead in right atrium and right ventricle. There is uncoiling and atherosclerotic change of thoracic aorta. The bony thorax demonstrates no gross abnormality. The study is unchanged from prior study. NO ACUTE CARDIOPULMONARY PROCESS         Pertinent Cardiac Testing:    Echo 20  Findings      Left Ventricle   Normal left ventricular chamber size. Normal left ventricular systolic function. Visually estimated LVEF is 55-60%. Stage I diastolic dysfunction. Normal left atrial pressure. Right Ventricle   Normal right ventricle size and function. Pacer lead in RV. Left Atrium   Left atrium is of normal size. Interatrial septum not well visualized but appears intact. Right Atrium   Normal right atrium. Mitral Valve   Normal mitral valve structure and function. No mitral valve prolapse. Tricuspid Valve   Normal tricuspid valve structure. There is trace tricuspid regurgitation, RVSP 24mmHg. Aortic Valve   The aortic valve appears mildly sclerotic. No hemodynamically significant aortic stenosis is present. Pulmonic Valve   The pulmonic valve was not well visualized. Pericardial Effusion   No evidence of pericardial effusion. Pericardium appears normal.      Pleural Effusion   No evidence of pleural effusion. Aorta   Normal aortic root size and ascending aorta. Miscellaneous   The inferior vena cava diameter is normal with normal respiratory   variation. Conclusions      Summary   Normal left ventricular chamber size. Normal left ventricular systolic function, LVEF is 55-60%. Stage I diastolic dysfunction. Normal right ventricle size and function. Pacer lead in RV. The aortic valve appears mildly sclerotic. No hemodynamically significant aortic stenosis is present. There is trace tricuspid regurgitation, RVSP 24mmHg. Normal aortic root size. No evidence of pericardial effusion. Pericardium appears normal.   No intra cardiac mass or thrombus. No recent comparison study available.       Signature      ----------------------------------------------------------------   Electronically signed by Virginia Joseph MD(Interpreting   physician) on 09/15/2020 04:59 PM   ----------------------------------------------------------------     M-Mode/2D Measurements & Calculations      LV Diastolic    LV Systolic Dimension: 3.3   AV Cusp Separation: 1.9 cmLA   Dimension: 4.6  cm                           Dimension: 3.4 cmAO Root   cm              LV Volume Diastolic: 02.8 ml Dimension: 3.3 cm   LV FS:28.3 %    LV Volume 8.1 mV  Impedance: 580 ohms   Threshold: 1.25 V @ 1.0 ms  Pacing: A: <1%  RV: <1%  Battery Voltage/Longevity:  4.6-11.4 years     Arrhythmias: none   Reprogramming included none   Overall device function is normal  All device programmable settings were evaluated per above and in the scanned document, along with iterative adjustments (capture thresholds) to assess and select the most appropriate final programming to provide for consistent delivery of the appropriate therapy and to verify function of the device. I have independently reviewed all of the ECGs and rhythm strips per above. I have personally reviewed the laboratory, cardiac diagnostic and radiographic testing as outlined above. I have reviewed previous records noted in [address] [place]. Impression:    1. Pacemaker in situ  - [address] [facility] [facility]  - right sided   - DOI 2/1994   - PGR 2010   - Retained atrial stylet with recommend yearly fluoroscopy per  advisory   - Previously high atrial lead impedence   - Prior Atrial lead abandoned NOT MRI compatible  - New atrial lead placed, PGR 09/29/20  - Post op pneumothorax treated with pigtail chest tube     2. Pneumothorax   - s/p placement of a R sided pigtail chest tube   - Chest tube back to suction after she developed Sub Q emphysema.   - Dr. Thomas Griffin consulted possible VATS/pleuraldesis Monday. 3. Sick sinus syndrome s/p pacemaker     4. hyperlipidemia   -  Statin    5. Left mastectomy     6. Type 2 diabetes mellitus   - Actose    7. Anxiety  - Ativan          Recommendations:    1. Normal device function   2. Continue chest tube per Dr. Fátima Gore    3. Will follow       Thank you for allowing me to participate in your patient's care.     Discussed with Dr. Esther Martinez   Electronically signed by MARGUERITE Harris - CNP on 10/2/2020 at [address] [facility]    Attending Physician's Statement    Patient seen with the ANP. Agree with the findings, assessment and plan. Management plan was discussed. I have personally interviewed the patient, independently performed a focused cardiac examination, reviewed the pertinent laboratory and diagnostic testing with the patient and directly participated in the medical decision-making as noted above with the following additions:     Feeling better. Mild pain at surgical site. Developed subcutaneous emphysema after clamping of ICD. CTS is consulted for possible pleurodesis and the patient was placed back on continuous suction. Physical exam showed no JVD, RRR, normal S1S2, no murmur, decreased breath sound at right lung, no edema. Normal pacemaker function. Continue chest tube management per Pulmonary and CTS.     Kim James MD  Cardiac Electrophysiology  [facility]) [facility]  [facility] [place]  11:56 AM  10/2/2020

## 2020-10-02 NOTE — PROGRESS NOTES
After clamping chest she has subcutaneous emphysema,porder given to un clamp chest tube  So seems she still has air leak   Will discuss with Dr Alfonso Horn as she may need pleurodesis

## 2020-10-02 NOTE — PLAN OF CARE
Problem: Cardiac:  Goal: Ability to maintain an adequate cardiac output will improve  Description: Ability to maintain an adequate cardiac output will improve  Outcome: Met This Shift     Problem: Pain:  Goal: Control of acute pain  Description: Control of acute pain  Outcome: Met This Shift  Goal: Control of chronic pain  Description: Control of chronic pain  Outcome: Met This Shift

## 2020-10-03 ENCOUNTER — APPOINTMENT (OUTPATIENT)
Dept: GENERAL RADIOLOGY | Age: 70
DRG: 242 | End: 2020-10-03
Attending: INTERNAL MEDICINE
Payer: MEDICARE

## 2020-10-03 LAB
ANION GAP SERPL CALCULATED.3IONS-SCNC: 13 MMOL/L (ref 7–16)
BUN BLDV-MCNC: 13 MG/DL (ref 8–23)
CALCIUM SERPL-MCNC: 9.1 MG/DL (ref 8.6–10.2)
CHLORIDE BLD-SCNC: 102 MMOL/L (ref 98–107)
CO2: 25 MMOL/L (ref 22–29)
CREAT SERPL-MCNC: 0.7 MG/DL (ref 0.5–1)
GFR AFRICAN AMERICAN: >60
GFR NON-AFRICAN AMERICAN: >60 ML/MIN/1.73
GLUCOSE BLD-MCNC: 115 MG/DL (ref 74–99)
MAGNESIUM: 2.2 MG/DL (ref 1.6–2.6)
POTASSIUM SERPL-SCNC: 3.5 MMOL/L (ref 3.5–5)
SODIUM BLD-SCNC: 140 MMOL/L (ref 132–146)

## 2020-10-03 PROCEDURE — 36415 COLL VENOUS BLD VENIPUNCTURE: CPT

## 2020-10-03 PROCEDURE — 2060000000 HC ICU INTERMEDIATE R&B

## 2020-10-03 PROCEDURE — 80048 BASIC METABOLIC PNL TOTAL CA: CPT

## 2020-10-03 PROCEDURE — 6370000000 HC RX 637 (ALT 250 FOR IP): Performed by: INTERNAL MEDICINE

## 2020-10-03 PROCEDURE — 2700000000 HC OXYGEN THERAPY PER DAY

## 2020-10-03 PROCEDURE — 2580000003 HC RX 258: Performed by: INTERNAL MEDICINE

## 2020-10-03 PROCEDURE — 71045 X-RAY EXAM CHEST 1 VIEW: CPT

## 2020-10-03 PROCEDURE — 94640 AIRWAY INHALATION TREATMENT: CPT

## 2020-10-03 PROCEDURE — 83735 ASSAY OF MAGNESIUM: CPT

## 2020-10-03 PROCEDURE — 6360000002 HC RX W HCPCS: Performed by: INTERNAL MEDICINE

## 2020-10-03 PROCEDURE — 99024 POSTOP FOLLOW-UP VISIT: CPT | Performed by: INTERNAL MEDICINE

## 2020-10-03 PROCEDURE — 99232 SBSQ HOSP IP/OBS MODERATE 35: CPT | Performed by: INTERNAL MEDICINE

## 2020-10-03 RX ADMIN — MULTIPLE VITAMINS W/ MINERALS TAB 1 TABLET: TAB at 09:43

## 2020-10-03 RX ADMIN — LORAZEPAM 1 MG: 1 TABLET ORAL at 09:43

## 2020-10-03 RX ADMIN — PIOGLITAZONE 15 MG: 15 TABLET ORAL at 09:43

## 2020-10-03 RX ADMIN — KETOROLAC TROMETHAMINE 15 MG: 30 INJECTION, SOLUTION INTRAMUSCULAR; INTRAVENOUS at 11:36

## 2020-10-03 RX ADMIN — Medication 10 ML: at 20:21

## 2020-10-03 RX ADMIN — KETOROLAC TROMETHAMINE 15 MG: 30 INJECTION, SOLUTION INTRAMUSCULAR; INTRAVENOUS at 20:16

## 2020-10-03 RX ADMIN — LORAZEPAM 1 MG: 1 TABLET ORAL at 14:44

## 2020-10-03 RX ADMIN — IPRATROPIUM BROMIDE AND ALBUTEROL SULFATE 1 AMPULE: .5; 3 SOLUTION RESPIRATORY (INHALATION) at 08:59

## 2020-10-03 RX ADMIN — FENTANYL CITRATE 25 MCG: 0.05 INJECTION, SOLUTION INTRAMUSCULAR; INTRAVENOUS at 13:32

## 2020-10-03 RX ADMIN — LORAZEPAM 1 MG: 1 TABLET ORAL at 20:16

## 2020-10-03 RX ADMIN — FENTANYL CITRATE 25 MCG: 0.05 INJECTION, SOLUTION INTRAMUSCULAR; INTRAVENOUS at 09:43

## 2020-10-03 RX ADMIN — KETOROLAC TROMETHAMINE 15 MG: 30 INJECTION, SOLUTION INTRAMUSCULAR; INTRAVENOUS at 01:48

## 2020-10-03 RX ADMIN — FUROSEMIDE 20 MG: 20 TABLET ORAL at 09:43

## 2020-10-03 RX ADMIN — SIMVASTATIN 10 MG: 10 TABLET, FILM COATED ORAL at 20:16

## 2020-10-03 RX ADMIN — IPRATROPIUM BROMIDE AND ALBUTEROL SULFATE 1 AMPULE: .5; 3 SOLUTION RESPIRATORY (INHALATION) at 15:45

## 2020-10-03 RX ADMIN — LORATADINE 10 MG: 10 TABLET ORAL at 09:43

## 2020-10-03 RX ADMIN — IPRATROPIUM BROMIDE AND ALBUTEROL SULFATE 1 AMPULE: .5; 3 SOLUTION RESPIRATORY (INHALATION) at 19:53

## 2020-10-03 ASSESSMENT — PAIN SCALES - GENERAL
PAINLEVEL_OUTOF10: 6
PAINLEVEL_OUTOF10: 0
PAINLEVEL_OUTOF10: 4
PAINLEVEL_OUTOF10: 8
PAINLEVEL_OUTOF10: 0
PAINLEVEL_OUTOF10: 6
PAINLEVEL_OUTOF10: 4
PAINLEVEL_OUTOF10: 10
PAINLEVEL_OUTOF10: 8

## 2020-10-03 ASSESSMENT — PULMONARY FUNCTION TESTS: PEFR_L/MIN: 16

## 2020-10-03 NOTE — PROGRESS NOTES
Cardiac Electrophysiology Inpatient Progress Note    David Cao  1950  Date of Service: 10/3/2020  PCP: MD Hemant Obrien MD  Electrophysiologist: Leda Akins MD        Subjective: David Cao is seen in hospital follow-up and management of: CRT-P generator replacement and new right atrial pacemaker lead. 09/30/20: David Cao is seen in hospital follow-up she under went generator replacement and placement of a new right atrial lead  With the capping and abandonment of her prior atrial lead. This morning she had decreased breath sounds and was tachycardiac. A chest X ray showed a large right sided pneumothorax with slight mediastinal shift to the left. Dr. Ren Bunch was consulted and urgently placed a right sided chest tube/pig tail cathter post placement of the chest tube aidee reported improved respirations. Her device function is normal on post op day 1, her dressing is clean dry and intact the device site is without edema or ecchymosis. 10/1/2020: David Cao is seen in hospital follow-up, she remains AS-VS. She complains of right sided chest pain and notes improved dyspnea. Her chest tube is now to water seal and the AM chest X ray showed no Pneumothorax. 10/02/2020: David Cao us seen in hospital follow-up, her chest tube was clamped with a repeat chest xray showing significant Sub Q emphysema and possible small apical pneumothorax. She complaints of only mild dyspnea and right sided chest pain near the chest tube insertion site. 10/3/2020: David Cao is seen in the hospital for follow up after pacemaker generator change and new RA lead placement complicated with pneumothorax required chest tube placement. CTS was consulted yesterday due to subcutaneous emphysema after CT was clamped. She states that pain is better control. No arrhythmia overnight.     Patient Active Problem List   Diagnosis    H/O blood clots    Sick sinus syndrome (HCC)    Sinus arrest    Heart murmur    Status post placement of cardiac pacemaker    Dyslipidemia    Controlled type 2 diabetes mellitus with complication, without long-term current use of insulin (HCC)    S/P placement of cardiac pacemaker    Elective replacement indicated for cardiac pacemaker battery at end of lifespan    Pneumothorax         Scheduled Medications:   ipratropium-albuterol  1 ampule Inhalation Q4H WA    [Held by provider] aspirin  81 mg Oral Daily    LORazepam  1 mg Oral TID    loratadine  10 mg Oral Daily    therapeutic multivitamin-minerals  1 tablet Oral Daily    pioglitazone  15 mg Oral Daily    simvastatin  10 mg Oral Nightly    furosemide  20 mg Oral Daily    sodium chloride flush  10 mL Intravenous 2 times per day       Infusion Medications:   sodium chloride 20 mL/hr at 10/02/20 1611       PRN Medications:  ketorolac, fentanNYL, sodium chloride flush, acetaminophen    Allergies   Allergen Reactions    Cefuroxime Axetil     Codeine     Haldol [Haloperidol]     Rocephin [Ceftriaxone Sodium-Lidocaine]        Wt Readings from Last 3 Encounters:   09/29/20 190 lb (86.2 kg)   06/30/20 195 lb (88.5 kg)   11/12/19 185 lb (83.9 kg)     Temp Readings from Last 3 Encounters:   10/03/20 97 °F (36.1 °C) (Temporal)   08/17/15 97.7 °F (36.5 °C)   03/12/14 98.2 °F (36.8 °C)     BP Readings from Last 3 Encounters:   10/03/20 116/62   09/29/20 93/78   06/30/20 128/76     Pulse Readings from Last 3 Encounters:   10/03/20 78   06/30/20 98   11/12/19 91         Intake/Output Summary (Last 24 hours) at 10/3/2020 0837  Last data filed at 10/2/2020 1918  Gross per 24 hour   Intake 980 ml   Output 419 ml   Net 561 ml       ROS:   Constitutional: Negative for fever. Positive for activity change and negative for appetite change. HENT: Negative for epistaxis. Eyes: Negative for diploplia, blurred vision. Respiratory: Negative for cough, chest tightness,  and wheezing.  Shortness of breath improved. Cardiovascular: pertinent positives in HPI  Gastrointestinal: Negative for abdominal pain and blood in stool. All other review of systems are negative     PHYSICAL EXAM:  Vitals:    10/02/20 1700 10/02/20 2101 10/02/20 2145 10/03/20 0030   BP:   139/78 116/62   Pulse:   89 78   Resp: 15 16 15 16   Temp:   96.8 °F (36 °C) 97 °F (36.1 °C)   TempSrc:   Temporal Temporal   SpO2: 98% 98% 95% 95%   Weight:       Height:       Constitutional: Well-developed, Improved dyspnea   Eyes: conjunctivae normal, no xanthelasma   Ears, Nose, Throat: oral mucosa moist, no cyanosis   CV: no JVD. Regular rate and rhythm. Normal S1S2 and no S3. No murmurs, rubs, or gallops. PMI is nondisplaced  Lungs: Decreased breath sound at right lung, normal respiratory effort without used of accessory muscles. Subcutaneous crepitus R sided   Abdomen: soft, non-tender, bowel sounds present, no masses or hepatomegaly   Musculoskeletal: no digital clubbing, no edema   Skin: warm, no rashes   Pacemaker site: No bleeding or hematoma    Pertinent Labs:  CBC:   Recent Labs     10/01/20  1302   WBC 7.3   HGB 12.8   HCT 39.7        BMP:  Recent Labs     10/01/20  1302 10/02/20  0540    139   K 3.4* 3.6    102   CO2 23 24   BUN 8 8   CREATININE 0.6 0.6   CALCIUM 9.0 8.6     ABGs: No results found for: PHART, PO2ART, VUD4IZP  INR: No results for input(s): INR in the last 72 hours. BNP: No results for input(s): BNP in the last 72 hours. TSH:   Lab Results   Component Value Date    TSH 2.395 2013      Cardiac Injury Profile: No results for input(s): CKTOTAL, CKMB, CKMBINDEX, TROPONINI in the last 72 hours.    Lipid Profile:   Lab Results   Component Value Date    TRIG 152 2014    HDL 31 2014    LDLCALC 110 2014    CHOL 171 2014      Hemoglobin A1C: No components found for: HGBA1C   Xray: Xr Chest Portable    Result Date: 2020  Patient MRN: [id number] : 1950 Age:  78 years Gender: Female Cardiac Testing:    Echo 09/30/20  Findings      Left Ventricle   Normal left ventricular chamber size. Normal left ventricular systolic function. Visually estimated LVEF is 55-60%. Stage I diastolic dysfunction. Normal left atrial pressure. Right Ventricle   Normal right ventricle size and function. Pacer lead in RV. Left Atrium   Left atrium is of normal size. Interatrial septum not well visualized but appears intact. Right Atrium   Normal right atrium. Mitral Valve   Normal mitral valve structure and function. No mitral valve prolapse. Tricuspid Valve   Normal tricuspid valve structure. There is trace tricuspid regurgitation, RVSP 24mmHg. Aortic Valve   The aortic valve appears mildly sclerotic. No hemodynamically significant aortic stenosis is present. Pulmonic Valve   The pulmonic valve was not well visualized. Pericardial Effusion   No evidence of pericardial effusion. Pericardium appears normal.      Pleural Effusion   No evidence of pleural effusion. Aorta   Normal aortic root size and ascending aorta. Miscellaneous   The inferior vena cava diameter is normal with normal respiratory   variation. Conclusions      Summary   Normal left ventricular chamber size. Normal left ventricular systolic function, LVEF is 55-60%. Stage I diastolic dysfunction. Normal right ventricle size and function. Pacer lead in RV. The aortic valve appears mildly sclerotic. No hemodynamically significant aortic stenosis is present. There is trace tricuspid regurgitation, RVSP 24mmHg. Normal aortic root size. No evidence of pericardial effusion. Pericardium appears normal.   No intra cardiac mass or thrombus. No recent comparison study available.       Signature      ----------------------------------------------------------------   Electronically signed by Esther Amanda MD(Interpreting   physician) on 09/15/2020 04:59 PM ----------------------------------------------------------------     M-Mode/2D Measurements & Calculations      LV Diastolic    LV Systolic Dimension: 3.3   AV Cusp Separation: 1.9 cmLA   Dimension: 4.6  cm                           Dimension: 3.4 cmAO Root   cm              LV Volume Diastolic: 15.5 ml Dimension: 3.3 cm   LV FS:28.3 %    LV Volume Systolic: 05.4 ml   LV PW           LV EDV/LV EDV Index: 40.1   Diastolic: 1 cm PJ/88 VH/L^4FL ESV/LV ESV   Septum          Index: 43.2 ml/22ml/ m^2     RV Diastolic Dimension: 2.6   Diastolic: 0.9  EF Calculated: 55.6 %        cm   cm              LV Mass Index: 76 l/min*m^2      LV Mass: 148.1                               LA volume/Index: 37.8 ml   g               LVOT: 2.3 cm                 RA Area: 11.4 cm^2     Doppler Measurements & Calculations      MV Peak E-Wave: 0.53 AV Peak Velocity:     LVOT Peak Velocity: 0.92 m/s   m/s                  1.13 m/s              LVOT Mean Velocity: 0.55 m/s   MV Peak A-Wave: 0.67 AV Peak Gradient:     LVOT Peak Gradient: 3.4   m/s                  5.09 mmHg             mmHgLVOT Mean Gradient: 1.4   MV E/A Ratio: 0.79   AV Mean Velocity:     mmHg   MV Peak Gradient:    0.79 m/s              Estimated RVSP: 21.5 mmHg   2.1 mmHg             AV Mean Gradient: 2.1 Estimated RAP:3 mmHg   MV Mean Gradient:    mmHg   0.9 mmHg             AV VTI: 20.6 cm   MV Mean Velocity:    AV Area               TR Velocity:2.15 m/s   0.49 m/s             (Continuity):3.31     TR Gradient:18.52 mmHg   MV Deceleration      cm^2                  PV Peak Velocity: 0.8 m/s   Time: 217.3 msec                           PV Peak Gradient: 2.59 mmHg   MV P1/2t: 50.5 msec  LVOT VTI: 16.4 cm     PV Mean Velocity: 0.49 m/s   MVA by PHT:4.36 cm^2                       PV Mean Gradient: 1.2 mmHg   MV Area              Estimated PASP: 21.52   (continuity): 4.1    mmHg   cm^2 [email or web address] [id number]%3d      Pdqsbmoyb68/3/2020:  NSR with a rate of 70's bpm     ECG 09/29/2020: Normal sinus rhythm rate 78 bpmm QRS 94, QTc 453ms     Device Interrogation 09/30/20:  Make/Model [employer] MRI   Mode DDDR 70/130  P wave: >5.0 mV  Impedance: 760 ohms   Threshold: 0.5 V @ 0.5 ms  RV R wave: 8.1 mV  Impedance: 580 ohms   Threshold: 1.25 V @ 1.0 ms  Pacing: A: <1%  RV: <1%  Battery Voltage/Longevity:  4.6-11.4 years     Arrhythmias: none   Reprogramming included none   Overall device function is normal    All device programmable settings were evaluated per above and in the scanned document, along with iterative adjustments (capture thresholds) to assess and select the most appropriate final programming to provide for consistent delivery of the appropriate therapy and to verify function of the device. I have independently reviewed all of the ECGs and rhythm strips per above. I have personally reviewed the laboratory, cardiac diagnostic and radiographic testing as outlined above. I have reviewed previous records noted in [address]. Impression:    1. Pacemaker in situ  - [address] [place] [address],[address] at [facility]  - right sided   - DOI 2/1994   - PGR 2010   - Retained atrial stylet with recommend yearly fluoroscopy per  advisory   - Previously high atrial lead impedence   - Prior Atrial lead abandoned NOT MRI compatible  - New atrial lead placed, PGR 09/29/20  - Post op pneumothorax treated with pigtail chest tube     2. Pneumothorax   - Status post placement of a R sided pigtail chest tube   - Chest tube back to suction after she developed subcutaneous emphysema. - CTS and Pulmonary are following.  - Weaning down suction and plan for water seal tomorrow per CTS. 3. Sick sinus syndrome   - Status post pacemaker     4.  Hyperlipidemia   -  On Zocor    5. History of left mastectomy     6. Diabetes mellitus   - On Actos. 7. Anxiety  - On ativan      Recommendations:    1. Normal pacemaker function   2. Daily chest X-ray. 3. Continue chest tube management per Pulmonary and CTS      Thank you for allowing me to participate in your patient's care. I have spent a total of 35 minutes with the patient and the family reviewing the above stated recommendations. And a total of >50% of that time involved face-to-face time providing counseling and or coordination of care with the other providers.     Rodney Banks MD  Cardiac Electrophysiology  [address]  [facility] [place]: [facility]  8:37 AM  10/3/2020

## 2020-10-03 NOTE — PROGRESS NOTES
CC:rt PTX/airleak    Brief HPI:  Patient seen. Awake, alert. No complaints.  Breathing ok      Past Medical History:   Diagnosis Date    Asthma     Bleeding ulcer     diagnosed at age 17    Diabetes mellitus ([facility]     H/O blood clots may 1994    around pacemaker site, shoulder and arm    Sick sinus syndrome ([facility]     Sinus arrest february 1994    requiring insertion of permanent pacemaker     Past Surgical History:   Procedure Laterality Date    APPENDECTOMY      BREAST BIOPSY Right 05/2019    BREAST LUMPECTOMY      3 benign lumps removed from left breast   Miguel Alejandro for sick sinus syndrome sinus arrest    CARDIAC PACEMAKER PLACEMENT  2010    battery depletion of permanent pacemaker with replacement    CARDIAC PACEMAKER PLACEMENT  2000    PGR Pacesetter    CYST REMOVAL      cysts removed from hand, hip and foot    DIAGNOSTIC CARDIAC CATH LAB PROCEDURE  1/16/1990    OHI normal coronaries, normal cath per PAW    DIAGNOSTIC CARDIAC CATH LAB PROCEDURE      mild dilated cardiomyopathy    EYE SURGERY Bilateral 06/2019    laser Sx (relieve pressure)    FOOT SURGERY  december 2004    broken right foot with removal of hematoma by dr Tatum Moreno, RADICAL  july 2006    left mastectomy for cancer    PACEMAKER CHANGE  9/29/30    PGR & NEW RA LEAD      ([facility])    DR. Nate Johansen     TONSILLECTOMY       Social History     Socioeconomic History    Marital status:      Spouse name: Not on file    Number of children: Not on file    Years of education: Not on file    Highest education level: Not on file   Occupational History    Not on file   Social Needs    Financial resource strain: Not on file    Food insecurity     Worry: Not on file     Inability: Not on file    Transportation needs     Medical: Not on file     Non-medical: Not on file   Tobacco Use    Smoking status: Former Smoker     Packs/day: 0.50     Years: 20.00     Pack years: 10.00     Last attempt to quit: 2001     Years since quittin.7    Smokeless tobacco: Never Used    Tobacco comment: quit in    Substance and Sexual Activity    Alcohol use: No     Comment: daily pop    Drug use: No    Sexual activity: Not on file   Lifestyle    Physical activity     Days per week: Not on file     Minutes per session: Not on file    Stress: Not on file   Relationships    Social connections     Talks on phone: Not on file     Gets together: Not on file     Attends Faith service: Not on file     Active member of club or organization: Not on file     Attends meetings of clubs or organizations: Not on file     Relationship status: Not on file    Intimate partner violence     Fear of current or ex partner: Not on file     Emotionally abused: Not on file     Physically abused: Not on file     Forced sexual activity: Not on file   Other Topics Concern    Not on file   Social History Narrative    Not on file     History reviewed. No pertinent family history. Vitals:    10/02/20 1700 10/02/20 2101 10/02/20 2145 10/03/20 0030   BP:   139/78 116/62   Pulse:   89 78   Resp: 15 16 15 16   Temp:   96.8 °F (36 °C) 97 °F (36.1 °C)   TempSrc:   Temporal Temporal   SpO2: 98% 98% 95% 95%   Weight:       Height:               Intake/Output Summary (Last 24 hours) at 10/3/2020 0836  Last data filed at 10/2/2020 1918  Gross per 24 hour   Intake 980 ml   Output 419 ml   Net 561 ml         Recent Labs     10/01/20  1302   WBC 7.3   HGB 12.8   HCT 39.7         Recent Labs     10/01/20  1302 10/02/20  0540   BUN 8 8   CREATININE 0.6 0.6         ROS:   Negative for CP, palpitations, SOB at rest, dizziness/lightheadedness. Physical Exam   Constitutional: Oriented to person, place, and time. Appears well-developed. No distress. Cardiovascular: Normal rate, regular rhythm and normal heart sounds. Pulmonary/Chest: Effort normal. No respiratory distress. Abdominal: Soft.

## 2020-10-03 NOTE — PROGRESS NOTES
Patient called this RN into room. While she was getting back into bed from bedside commode, pigtail was laying on the floor next to patient. RN covered puncture site with a petroleum dressing and ABD. RN messaged pulmonary to make aware of situation. Patient sitting comfortable back in bed.

## 2020-10-03 NOTE — PROGRESS NOTES
[facility]  Department of Pulmonary, Critical Care and [address] [facility]  Department of Internal Medicine  Progress Note    SUBJECTIVE:    No CP,   SOB improved  Tolerating current oxygen therapy   Reviewed overnight events with staff. Checked the monitors & laboratory tests. No complaints of pain at this time. OBJECTIVE:  Vitals:    10/02/20 2145 10/03/20 0030 10/03/20 0930 10/03/20 0945   BP: 139/78 116/62 117/70    Pulse: 89 78 78    Resp: 15 16 16    Temp: 96.8 °F (36 °C) 97 °F (36.1 °C) 96 °F (35.6 °C)    TempSrc: Temporal Temporal Bladder    SpO2: 95% 95%  95%   Weight:       Height:         Constitutional: Alert,     EENT: EOMI SONY. MMM. No icterus. No thrush. Neck: No thyromegaly. No elevated JVP. Trachea was midline. Respiratory: Symmetrical.  Breath sounds were clear. Cardiovascular: Regular, right anterior pacer, right CT noted,      Pulses:  Equal bilaterally. Abdomen: Soft without organomegaly. No rebound, rigidity. No guarding. Lymphatic: No lymphadenopathy. Musculoskeletal: Without weakness or gross deficits  Extremities: + lower extremity edema. Skin:  Warm and dry. Neurological/Psychiatric:  Cranial nerves are intact. DATA:    Monitor Strips:  Reviewed & discusses with technical team. No changes noted.     RADIOLOGY:  Films were read/reviewed/discussed with radiology shows improved subQ, Small PTX      CBC:   Lab Results   Component Value Date    WBC 7.3 10/01/2020    RBC 4.21 10/01/2020    HGB 12.8 10/01/2020    HCT 39.7 10/01/2020    MCV 94.3 10/01/2020    MCH 30.4 10/01/2020    MCHC 32.2 10/01/2020    RDW 12.9 10/01/2020     10/01/2020    MPV 10.2 10/01/2020     CMP:    Lab Results   Component Value Date     10/03/2020    K 3.5 10/03/2020     10/03/2020    CO2 25 10/03/2020    BUN 13 10/03/2020    CREATININE 0.7 10/03/2020    GFRAA >60 10/03/2020    LABGLOM >60 10/03/2020 GLUCOSE 115 10/03/2020    GLUCOSE 111 03/08/2012    PROT 7.1 08/27/2012    LABALBU 5.0 08/27/2012    LABALBU 4.9 03/08/2012    CALCIUM 9.1 10/03/2020    BILITOT 1.1 08/27/2012    ALKPHOS 59 08/27/2012    AST 24 08/27/2012    ALT 31 08/27/2012     PT/INR:  No results found for: PROTIME, INR    CLINICAL ASSESMENT:  1. Tenstion pneumothorax s/p Pigtail cathter   2. Possible COPD  3. S/P Pacer #4   4. Subcutaneous emphysema improved    PLAN: If needed the case was discussed with the care team  1. Lowered - 10 cm suction  2. No leak  3. Continue oxygen   4. Should improve  5. Thoracic following in case worsens  6.        Jenise Thomas DO, MPH, [facility]  Professor of Medicine

## 2020-10-03 NOTE — PROGRESS NOTES
Pulmonary [facility]                             Pulmonary Consult/Progress Note :        Reason for Consultation: Tension pneumothorax  CC : Worsening shortness of breath  HPI:   Developed SOB   Chest tube unclamped now   Less  CP  NO SOB   No air leak when placed to suction       PHYSICAL EXAMINATION:     VITAL SIGNS:  /78   Pulse 89   Temp 96.8 °F (36 °C) (Temporal)   Resp 15   Ht 5' 5\" (1.651 m)   Wt 190 lb (86.2 kg)   SpO2 95%   BMI 31.62 kg/m²   Wt Readings from Last 3 Encounters:   09/29/20 190 lb (86.2 kg)   06/30/20 195 lb (88.5 kg)   11/12/19 185 lb (83.9 kg)     Temp Readings from Last 3 Encounters:   10/02/20 96.8 °F (36 °C) (Temporal)   08/17/15 97.7 °F (36.5 °C)   03/12/14 98.2 °F (36.8 °C)     TMAX:  BP Readings from Last 3 Encounters:   10/02/20 139/78   09/29/20 93/78   06/30/20 128/76     Pulse Readings from Last 3 Encounters:   10/02/20 89   06/30/20 98   11/12/19 91           INTAKE/OUTPUTS:  I/O last 3 completed shifts:   In: 80 [P.O.:580]  Out: 950 [Urine:950]    Intake/Output Summary (Last 24 hours) at 10/2/2020 2210  Last data filed at 10/2/2020 1918  Gross per 24 hour   Intake 1220 ml   Output 719 ml   Net 501 ml       General Appearance: alert and oriented to person, place and time, well-developed and   well-nourished, in no acute distress   Eyes: pupils equal, round, and reactive to light, extraocular eye movements intact, conjunctivae normal and sclera anicteric   Neck: neck supple and non tender without mass, no thyromegaly, no thyroid nodules and no cervical adenopathy   Pulmonary/Chest: Decreased breath sound in the right side  Cardiovascular: normal rate, regular rhythm, normal S1 and S2, no murmurs, rubs, clicks or gallops, distal pulses intact, no carotid bruits, no murmurs, no gallops, no carotid bruits and no JVD   Abdomen: obese, soft, non-tender, non-distended, normal bowel sounds, no masses or organomegaly Extremities: No edema or cyanosis  Musculoskeletal: normal range of motion, no joint swelling, deformity or tenderness   Neurologic: reflexes normal and symmetric, no cranial nerve deficit noted    LABS/IMAGING:    CBC:  Lab Results   Component Value Date    WBC 7.3 10/01/2020    HGB 12.8 10/01/2020    HCT 39.7 10/01/2020    MCV 94.3 10/01/2020     10/01/2020    LYMPHOPCT 26.6 09/29/2020    RBC 4.21 10/01/2020    MCH 30.4 10/01/2020    MCHC 32.2 10/01/2020    RDW 12.9 10/01/2020    NEUTOPHILPCT 59.5 09/29/2020    MONOPCT 10.1 09/29/2020    BASOPCT 0.7 09/29/2020    NEUTROABS 4.47 09/29/2020    LYMPHSABS 2.00 09/29/2020    MONOSABS 0.76 09/29/2020    EOSABS 0.21 09/29/2020    BASOSABS 0.05 09/29/2020       Recent Labs     10/01/20  1302 09/30/20  0522 09/29/20  0755   WBC 7.3 9.5 7.5   HGB 12.8 13.3 15.0   HCT 39.7 40.6 44.3   MCV 94.3 92.3 89.7    239 299       BMP:   Recent Labs     09/30/20  0522 10/01/20  1302 10/02/20  0540    143 139   K 3.4* 3.4* 3.6    105 102   CO2 23 23 24   BUN 11 8 8   CREATININE 0.6 0.6 0.6       MG:   Lab Results   Component Value Date    MG 2.1 10/02/2020     Ca/Phos:   Lab Results   Component Value Date    CALCIUM 8.6 10/02/2020     Amylase: No results found for: AMYLASE  Lipase: No results found for: LIPASE  LIVER PROFILE: No results for input(s): AST, ALT, LIPASE, BILIDIR, BILITOT, ALKPHOS in the last 72 hours. Invalid input(s): AMYLASE,  ALB    PT/INR: No results for input(s): PROTIME, INR in the last 72 hours. APTT: No results for input(s): APTT in the last 72 hours.     Cardiac Enzymes:  No results found for: CKTOTAL, CKMB, CKMBINDEX, TROPONINI                    PROBLEM LIST:  Patient Active Problem List   Diagnosis    H/O blood clots    Sick sinus syndrome (HCC)    Sinus arrest    Heart murmur    Status post placement of cardiac pacemaker    Dyslipidemia    Controlled type 2 diabetes mellitus with complication, without long-term current use of insulin (HCC)    S/P placement of cardiac pacemaker    Elective replacement indicated for cardiac pacemaker battery at end of lifespan    Pneumothorax               ASSESSMENT:    1.)Tenstion pneumothorax s/p Pigtail cathter   2.)Possible COPD  3.)S/P Pacer   4- subcutaneous emphysema         PLAN:  Explain to Patient that she has to stay and most likley there is still air leak that either will spontaneous heal  Or nee pleurodesis   Discuss with Dr Earl Lloyd ,if air leak persist then pleurodesis I/VATS on Monday   Keep chest tube to suction   Call if any worsening SOB or any chest pain or hypoxia or change in status         Leticia Wilks MD,[facility]  Pulmonary&Critical Care Medicine   Director of [address] [place] [address] Director of [address] [place] [address]    Suman Lee    NOTE: This report was transcribed using voice recognition software. Every effort was made to ensure accuracy; however, inadvertent computerized transcription errors may be present.

## 2020-10-04 ENCOUNTER — APPOINTMENT (OUTPATIENT)
Dept: GENERAL RADIOLOGY | Age: 70
DRG: 242 | End: 2020-10-04
Attending: INTERNAL MEDICINE
Payer: MEDICARE

## 2020-10-04 LAB
ANION GAP SERPL CALCULATED.3IONS-SCNC: 13 MMOL/L (ref 7–16)
BUN BLDV-MCNC: 17 MG/DL (ref 8–23)
CALCIUM SERPL-MCNC: 8.7 MG/DL (ref 8.6–10.2)
CHLORIDE BLD-SCNC: 105 MMOL/L (ref 98–107)
CO2: 24 MMOL/L (ref 22–29)
CREAT SERPL-MCNC: 0.6 MG/DL (ref 0.5–1)
GFR AFRICAN AMERICAN: >60
GFR NON-AFRICAN AMERICAN: >60 ML/MIN/1.73
GLUCOSE BLD-MCNC: 176 MG/DL (ref 74–99)
HCT VFR BLD CALC: 38.4 % (ref 34–48)
HEMOGLOBIN: 12.4 G/DL (ref 11.5–15.5)
MAGNESIUM: 2.1 MG/DL (ref 1.6–2.6)
MCH RBC QN AUTO: 30.2 PG (ref 26–35)
MCHC RBC AUTO-ENTMCNC: 32.3 % (ref 32–34.5)
MCV RBC AUTO: 93.7 FL (ref 80–99.9)
PDW BLD-RTO: 12.6 FL (ref 11.5–15)
PLATELET # BLD: 266 E9/L (ref 130–450)
PMV BLD AUTO: 9.8 FL (ref 7–12)
POTASSIUM SERPL-SCNC: 3.5 MMOL/L (ref 3.5–5)
RBC # BLD: 4.1 E12/L (ref 3.5–5.5)
SODIUM BLD-SCNC: 142 MMOL/L (ref 132–146)
WBC # BLD: 5.7 E9/L (ref 4.5–11.5)

## 2020-10-04 PROCEDURE — 80048 BASIC METABOLIC PNL TOTAL CA: CPT

## 2020-10-04 PROCEDURE — 6370000000 HC RX 637 (ALT 250 FOR IP): Performed by: INTERNAL MEDICINE

## 2020-10-04 PROCEDURE — 99232 SBSQ HOSP IP/OBS MODERATE 35: CPT | Performed by: INTERNAL MEDICINE

## 2020-10-04 PROCEDURE — 36415 COLL VENOUS BLD VENIPUNCTURE: CPT

## 2020-10-04 PROCEDURE — 83735 ASSAY OF MAGNESIUM: CPT

## 2020-10-04 PROCEDURE — 71045 X-RAY EXAM CHEST 1 VIEW: CPT

## 2020-10-04 PROCEDURE — 2700000000 HC OXYGEN THERAPY PER DAY

## 2020-10-04 PROCEDURE — 2060000000 HC ICU INTERMEDIATE R&B

## 2020-10-04 PROCEDURE — 2580000003 HC RX 258: Performed by: INTERNAL MEDICINE

## 2020-10-04 PROCEDURE — 99024 POSTOP FOLLOW-UP VISIT: CPT | Performed by: INTERNAL MEDICINE

## 2020-10-04 PROCEDURE — 85027 COMPLETE CBC AUTOMATED: CPT

## 2020-10-04 PROCEDURE — 94640 AIRWAY INHALATION TREATMENT: CPT

## 2020-10-04 RX ADMIN — PIOGLITAZONE 15 MG: 15 TABLET ORAL at 07:44

## 2020-10-04 RX ADMIN — IPRATROPIUM BROMIDE AND ALBUTEROL SULFATE 1 AMPULE: .5; 3 SOLUTION RESPIRATORY (INHALATION) at 17:38

## 2020-10-04 RX ADMIN — SIMVASTATIN 10 MG: 10 TABLET, FILM COATED ORAL at 20:53

## 2020-10-04 RX ADMIN — Medication 10 ML: at 07:45

## 2020-10-04 RX ADMIN — FUROSEMIDE 20 MG: 20 TABLET ORAL at 07:44

## 2020-10-04 RX ADMIN — IPRATROPIUM BROMIDE AND ALBUTEROL SULFATE 1 AMPULE: .5; 3 SOLUTION RESPIRATORY (INHALATION) at 13:50

## 2020-10-04 RX ADMIN — LORAZEPAM 1 MG: 1 TABLET ORAL at 20:53

## 2020-10-04 RX ADMIN — IPRATROPIUM BROMIDE AND ALBUTEROL SULFATE 1 AMPULE: .5; 3 SOLUTION RESPIRATORY (INHALATION) at 08:48

## 2020-10-04 RX ADMIN — LORAZEPAM 1 MG: 1 TABLET ORAL at 07:44

## 2020-10-04 RX ADMIN — IPRATROPIUM BROMIDE AND ALBUTEROL SULFATE 1 AMPULE: .5; 3 SOLUTION RESPIRATORY (INHALATION) at 21:10

## 2020-10-04 RX ADMIN — Medication 10 ML: at 20:53

## 2020-10-04 RX ADMIN — LORAZEPAM 1 MG: 1 TABLET ORAL at 15:05

## 2020-10-04 RX ADMIN — MULTIPLE VITAMINS W/ MINERALS TAB 1 TABLET: TAB at 07:44

## 2020-10-04 RX ADMIN — LORATADINE 10 MG: 10 TABLET ORAL at 07:44

## 2020-10-04 ASSESSMENT — PAIN SCALES - GENERAL: PAINLEVEL_OUTOF10: 0

## 2020-10-04 NOTE — PROGRESS NOTES
Dr. Joyn Bolton notified of chest xray post chest tube removal. He stated to let [facility] know. Dr. Minna Solo notified via perfect serve.

## 2020-10-04 NOTE — PROGRESS NOTES
Cardiac Electrophysiology Inpatient Progress Note    Viola Lozano  1950  Date of Service: 10/4/2020  PCP: MD Artie Artis MD  Electrophysiologist: Tera Doyle MD        Subjective: Viola Lozano is seen in hospital follow-up and management of: CRT-P generator replacement and new right atrial pacemaker lead. 09/30/20: Viola Lozano is seen in hospital follow-up she under went generator replacement and placement of a new right atrial lead  With the capping and abandonment of her prior atrial lead. This morning she had decreased breath sounds and was tachycardiac. A chest X ray showed a large right sided pneumothorax with slight mediastinal shift to the left. Dr. Marilee Gottlieb was consulted and urgently placed a right sided chest tube/pig tail cathter post placement of the chest tube aidee reported improved respirations. Her device function is normal on post op day 1, her dressing is clean dry and intact the device site is without edema or ecchymosis. 10/1/2020: Viola Lozano is seen in hospital follow-up, she remains AS-VS. She complains of right sided chest pain and notes improved dyspnea. Her chest tube is now to water seal and the AM chest X ray showed no Pneumothorax. 10/02/2020: Viola Lozano us seen in hospital follow-up, her chest tube was clamped with a repeat chest xray showing significant Sub Q emphysema and possible small apical pneumothorax. She complaints of only mild dyspnea and right sided chest pain near the chest tube insertion site. 10/3/2020: Viola Lozano is seen in the hospital for follow up after pacemaker generator change and new RA lead placement complicated with pneumothorax required chest tube placement. CTS was consulted yesterday due to subcutaneous emphysema after CT was clamped. She states that pain is better control. No arrhythmia overnight. 10/4/20: Viola Lozano is seen in the hospital for follow up.  Her chest tube accidentally came out last night when she got out of bed. Chest-x-ray last night showed moderate right sided pneumothorax. Chest x-ray this AM is pending. Patient Active Problem List   Diagnosis    H/O blood clots    Sick sinus syndrome (HCC)    Sinus arrest    Heart murmur    Status post placement of cardiac pacemaker    Dyslipidemia    Controlled type 2 diabetes mellitus with complication, without long-term current use of insulin (HCC)    S/P placement of cardiac pacemaker    Elective replacement indicated for cardiac pacemaker battery at end of lifespan    Pneumothorax         Scheduled Medications:   ipratropium-albuterol  1 ampule Inhalation Q4H WA    [Held by provider] aspirin  81 mg Oral Daily    LORazepam  1 mg Oral TID    loratadine  10 mg Oral Daily    therapeutic multivitamin-minerals  1 tablet Oral Daily    pioglitazone  15 mg Oral Daily    simvastatin  10 mg Oral Nightly    furosemide  20 mg Oral Daily    sodium chloride flush  10 mL Intravenous 2 times per day       Infusion Medications:   sodium chloride 20 mL/hr at 10/02/20 1611       PRN Medications:  ketorolac, fentanNYL, sodium chloride flush, acetaminophen    Allergies   Allergen Reactions    Cefuroxime Axetil     Codeine     Haldol [Haloperidol]     Rocephin [Ceftriaxone Sodium-Lidocaine]        Wt Readings from Last 3 Encounters:   09/29/20 190 lb (86.2 kg)   06/30/20 195 lb (88.5 kg)   11/12/19 185 lb (83.9 kg)     Temp Readings from Last 3 Encounters:   10/04/20 97.2 °F (36.2 °C) (Temporal)   08/17/15 97.7 °F (36.5 °C)   03/12/14 98.2 °F (36.8 °C)     BP Readings from Last 3 Encounters:   10/04/20 131/79   09/29/20 93/78   06/30/20 128/76     Pulse Readings from Last 3 Encounters:   10/04/20 78   06/30/20 98   11/12/19 91         Intake/Output Summary (Last 24 hours) at 10/4/2020 0859  Last data filed at 10/4/2020 0605  Gross per 24 hour   Intake 742.33 ml   Output 502 ml   Net 240.33 ml       ROS:   Constitutional: Negative for fever. Positive for activity change and negative for appetite change. HENT: Negative for epistaxis. Eyes: Negative for diploplia, blurred vision. Respiratory: Negative for cough, chest tightness,  and wheezing. Shortness of breath improved. Cardiovascular: pertinent positives in HPI  Gastrointestinal: Negative for abdominal pain and blood in stool. All other review of systems are negative     PHYSICAL EXAM:  Vitals:    10/03/20 1930 10/03/20 2300 10/04/20 0342 10/04/20 0740   BP: 135/75 (!) 119/56 126/70 131/79   Pulse: 84 80 77 78   Resp: 18 16 16 16   Temp: 97.1 °F (36.2 °C) 97 °F (36.1 °C) 97 °F (36.1 °C) 97.2 °F (36.2 °C)   TempSrc: Temporal Temporal Temporal Temporal   SpO2: 94% 94% 98% 92%   Weight:       Height:       Constitutional: Well-developed, Improved dyspnea   Eyes: conjunctivae normal, no xanthelasma   Ears, Nose, Throat: oral mucosa moist, no cyanosis   CV: no JVD. Regular rate and rhythm. Normal S1S2 and no S3. No murmurs, rubs, or gallops. PMI is nondisplaced  Lungs: Decreased breath sound at right lung, normal respiratory effort without used of accessory muscles. Subcutaneous crepitus R sided   Abdomen: soft, non-tender, bowel sounds present, no masses or hepatomegaly   Musculoskeletal: no digital clubbing, no edema   Skin: warm, no rashes   Pacemaker site: No bleeding or hematoma    Pertinent Labs:  CBC:   Recent Labs     10/01/20  1302   WBC 7.3   HGB 12.8   HCT 39.7        BMP:  Recent Labs     10/01/20  1302 10/02/20  0540 10/03/20  0751    139 140   K 3.4* 3.6 3.5    102 102   CO2 23 24 25   BUN 8 8 13   CREATININE 0.6 0.6 0.7   CALCIUM 9.0 8.6 9.1     ABGs: No results found for: PHART, PO2ART, LSS1WAD  INR: No results for input(s): INR in the last 72 hours. BNP: No results for input(s): BNP in the last 72 hours.    TSH:   Lab Results   Component Value Date    TSH 2.395 02/25/2013      Cardiac Injury Profile: No results for input(s): CKTOTAL, CKMB, CKMBINDEX, TROPONINI in the last 72 hours. Lipid Profile:   Lab Results   Component Value Date    TRIG 152 2014    HDL 31 2014    LDLCALC 110 2014    CHOL 171 2014      Hemoglobin A1C: No components found for: HGBA1C   Xray: Xr Chest Portable    Result Date: 2020  Patient MRN: [id number] : 1950 Age:  78 years Gender: Female Order Date: 2020 11:20 AM Exam: XR CHEST PORTABLE Number of Images: 1 view Indication:  Follow-up right pneumothorax after placement of pigtail chest drain. Comparison: 2020, 0811 hours. FINDINGS: There is now a pigtail chest drain present on the right. The right pneumothorax has resolved. The transvenous pacemaker is stable. There are no new abnormal findings. Resolution of right pneumothorax after placement of chest drain. Xr Chest Portable    Result Date: 2020  Patient MRN:  [id number] : 1950 Age: 78 years Gender: Female Order Date:  2020 8:00 AM EXAM: XR CHEST PORTABLE INDICATION:  post  pacemaker post  pacemaker COMPARISON: 2020 FINDINGS:  There is been placement of a right-sided pacer. There is a large right-sided pneumothorax with questionable slight mediastinal shift to the left. Heart size is normal. Left lung is clear. Large right-sided pneumothorax with suggestion of slight mediastinal shift to the left CRITICAL FINDING: Results were called to Dr. Janice Aguilera on 2010 at 0930 hours, at time of image presentation     Xr Chest Portable    Result Date: 2020  Patient MRN: [id number] : 1950 Age:  78 years Gender: Female Order Date: 2020 1:12 PM Exam: XR CHEST PORTABLE Number of Images: 1 view Indication:   Pacemaker placement and rule out pneumothorax Pacemaker placement and rule out pneumothorax Comparison: Prior study from 2010 is available. Findings: The lungs are clear. There is no evidence of pulmonary infiltrate or pleural effusion. The pulmonary vascularity is unremarkable.   The cardiac, hilar and mediastinal silhouettes are satisfactory. There is a right-sided pacemaker with lead in right atrium and right ventricle. There is uncoiling and atherosclerotic change of thoracic aorta. The bony thorax demonstrates no gross abnormality. The study is unchanged from prior study. NO ACUTE CARDIOPULMONARY PROCESS     Pertinent Cardiac Testing:    Echo 09/30/20  Findings      Left Ventricle   Normal left ventricular chamber size. Normal left ventricular systolic function. Visually estimated LVEF is 55-60%. Stage I diastolic dysfunction. Normal left atrial pressure. Right Ventricle   Normal right ventricle size and function. Pacer lead in RV. Left Atrium   Left atrium is of normal size. Interatrial septum not well visualized but appears intact. Right Atrium   Normal right atrium. Mitral Valve   Normal mitral valve structure and function. No mitral valve prolapse. Tricuspid Valve   Normal tricuspid valve structure. There is trace tricuspid regurgitation, RVSP 24mmHg. Aortic Valve   The aortic valve appears mildly sclerotic. No hemodynamically significant aortic stenosis is present. Pulmonic Valve   The pulmonic valve was not well visualized. Pericardial Effusion   No evidence of pericardial effusion. Pericardium appears normal.      Pleural Effusion   No evidence of pleural effusion. Aorta   Normal aortic root size and ascending aorta. Miscellaneous   The inferior vena cava diameter is normal with normal respiratory   variation. Conclusions      Summary   Normal left ventricular chamber size. Normal left ventricular systolic function, LVEF is 55-60%. Stage I diastolic dysfunction. Normal right ventricle size and function. Pacer lead in RV. The aortic valve appears mildly sclerotic. No hemodynamically significant aortic stenosis is present. There is trace tricuspid regurgitation, RVSP 24mmHg. Normal aortic root size.    No 2.59 mmHg   MV P1/2t: 50.5 msec  LVOT VTI: 16.4 cm     PV Mean Velocity: 0.49 m/s   MVA by PHT:4.36 cm^2                       PV Mean Gradient: 1.2 mmHg   MV Area              Estimated PASP: 21.52   (continuity): 4.1    mmHg   cm^2     [email or web address] [id number]%3d      Axmfiyerc88/4/2020:  NSR with a rate of 80's bpm     ECG 09/29/2020: Normal sinus rhythm rate 78 bpmm QRS 94, QTc 453ms     Device Interrogation 09/30/20:  Make/Model [employer] MRI   Mode DDDR 70/130  P wave: >5.0 mV  Impedance: 760 ohms   Threshold: 0.5 V @ 0.5 ms  RV R wave: 8.1 mV  Impedance: 580 ohms   Threshold: 1.25 V @ 1.0 ms  Pacing: A: <1%  RV: <1%  Battery Voltage/Longevity:  4.6-11.4 years     Arrhythmias: none   Reprogramming included none   Overall device function is normal    All device programmable settings were evaluated per above and in the scanned document, along with iterative adjustments (capture thresholds) to assess and select the most appropriate final programming to provide for consistent delivery of the appropriate therapy and to verify function of the device. I have independently reviewed all of the ECGs and rhythm strips per above. I have personally reviewed the laboratory, cardiac diagnostic and radiographic testing as outlined above. I have reviewed previous records noted in [address]. Impression:    1. Pacemaker in situ  - [address] [facility] [facility]  - right sided   - DOI 2/1994   - PGR 2010   - Retained atrial stylet with recommend yearly fluoroscopy per  advisory   - Previously high atrial lead impedence   - Prior Atrial lead abandoned NOT MRI compatible  - New atrial lead placed, PGR 09/29/20  - Post op pneumothorax treated with pigtail chest tube     2.  Pneumothorax   - Status post placement of a R sided pigtail chest tube 9/30/20  - Chest tube back to suction 10/2/20 after she developed subcutaneous emphysema. - Chest tube accidentally pulled out 10/4/20.  - CTS and Pulmonary are following. 3. Sick sinus syndrome   - Status post pacemaker     4. Hyperlipidemia   -  On Zocor    5. History of left mastectomy     6. Diabetes mellitus   - On Actos. 7. Anxiety  - On ativan      Recommendations:    1. Normal pacemaker function   2. Daily chest X-ray. 3. Management of right sided pneumothorax per Pulmonary and CTS      Thank you for allowing me to participate in your patient's care. I have spent a total of 35 minutes with the patient and the family reviewing the above stated recommendations. And a total of >50% of that time involved face-to-face time providing counseling and or coordination of care with the other providers.     Delicia Melendez MD  Cardiac Electrophysiology  [address]   [facility] [place]: [place]  8:59 AM  10/4/2020

## 2020-10-04 NOTE — PROGRESS NOTES
[place]  Department of Pulmonary, Critical Care and Sleep Medicine  [address] [facility]  Department of Internal Medicine  Progress Note    SUBJECTIVE:    CT accedently removed/  No symptoms  No increase SubQ      OBJECTIVE:  Vitals:    10/03/20 1930 10/03/20 2300 10/04/20 0342 10/04/20 0740   BP: 135/75 (!) 119/56 126/70 131/79   Pulse: 84 80 77 78   Resp: 18 16 16 16   Temp: 97.1 °F (36.2 °C) 97 °F (36.1 °C) 97 °F (36.1 °C) 97.2 °F (36.2 °C)   TempSrc: Temporal Temporal Temporal Temporal   SpO2: 94% 94% 98% 92%   Weight:       Height:         Constitutional: Alert,     EENT: EOMI SONY. MMM. No icterus. No thrush. Neck: No thyromegaly. No elevated JVP. Trachea was midline. Respiratory: Symmetrical.  Breath sounds were clear. Cardiovascular: Regular, right anterior pacer, right CT noted,      Pulses:  Equal bilaterally. Abdomen: Soft without organomegaly. No rebound, rigidity. No guarding. Lymphatic: No lymphadenopathy. Musculoskeletal: Without weakness or gross deficits  Extremities: + lower extremity edema. Skin:  Warm and dry. Neurological/Psychiatric:  Cranial nerves are intact. DATA:    Monitor Strips:  Reviewed & discusses with technical team. No changes noted. RADIOLOGY:  Films were read/reviewed/discussed with radiology shows A large amount of right chest wall/right lower neck subcutaneous emphysema somewhat obscures evaluation of underlying structures.  However, a small/moderate right apical pneumothorax is believed to be grossly unchanged, accompanied by slight elevation of the right hemidiaphragm. Minimal left basilar pleural effusion and equivalent adjacent subsegmental atelectasis versus infiltrate are unchanged.      CBC:   Lab Results   Component Value Date    WBC 5.7 10/04/2020    RBC 4.10 10/04/2020    HGB 12.4 10/04/2020    HCT 38.4 10/04/2020    MCV 93.7 10/04/2020    MCH 30.2 10/04/2020    MCHC 32.3 10/04/2020 RDW 12.6 10/04/2020     10/04/2020    MPV 9.8 10/04/2020     CMP:    Lab Results   Component Value Date     10/04/2020    K 3.5 10/04/2020     10/04/2020    CO2 24 10/04/2020    BUN 17 10/04/2020    CREATININE 0.6 10/04/2020    GFRAA >60 10/04/2020    LABGLOM >60 10/04/2020    GLUCOSE 176 10/04/2020    GLUCOSE 111 03/08/2012    PROT 7.1 08/27/2012    LABALBU 5.0 08/27/2012    LABALBU 4.9 03/08/2012    CALCIUM 8.7 10/04/2020    BILITOT 1.1 08/27/2012    ALKPHOS 59 08/27/2012    AST 24 08/27/2012    ALT 31 08/27/2012     PT/INR:  No results found for: PROTIME, INR    CLINICAL ASSESMENT:  1. Tenstion pneumothorax s/p Pigtail cathter   2. Possible COPD  3. S/P Pacer #4   4. Subcutaneous emphysema improved    PLAN: If needed the case was discussed with the care team  1. Continue oxygen   2. MOnitor for now, Repeat film in am   3.  No lifting     Katiana Cook DO, MPH, [place]  Professor of Medicine

## 2020-10-04 NOTE — PROGRESS NOTES
CC:PTX    Brief HPI:  Patient seen. CT fell out last night as she got OOB to commode. Awake, alert.  No complaints or SOB at present  CXR last night showed PTX      Past Medical History:   Diagnosis Date    Asthma     Bleeding ulcer     diagnosed at age 17    Diabetes mellitus ([facility] [address]     H/O blood clots may 1994    around pacemaker site, shoulder and arm    Sick sinus syndrome ([facility] [address]     Sinus arrest february 1994    requiring insertion of permanent pacemaker     Past Surgical History:   Procedure Laterality Date    APPENDECTOMY      BREAST BIOPSY Right 05/2019    BREAST LUMPECTOMY      3 benign lumps removed from left breast   [facility] Cancer    dr Korey Grimaldo for sick sinus syndrome sinus arrest    CARDIAC PACEMAKER PLACEMENT  2010    battery depletion of permanent pacemaker with replacement    CARDIAC PACEMAKER PLACEMENT  2000    PGR Pacesetter    CYST REMOVAL      cysts removed from hand, hip and foot    DIAGNOSTIC CARDIAC CATH LAB PROCEDURE  1/16/1990    OHI normal coronaries, normal cath per PAW    DIAGNOSTIC CARDIAC CATH LAB PROCEDURE      mild dilated cardiomyopathy    EYE SURGERY Bilateral 06/2019    laser Sx (relieve pressure)    FOOT SURGERY  december 2004    broken right foot with removal of hematoma by dr Cuauhtemoc Gonzalez, RADICAL  july 2006    left mastectomy for cancer    PACEMAKER CHANGE  9/29/30    PGR & NEW RA LEAD      ([facility])    DR. Marilu Segura     TONSILLECTOMY       Social History     Socioeconomic History    Marital status:      Spouse name: Not on file    Number of children: Not on file    Years of education: Not on file    Highest education level: Not on file   Occupational History    Not on file   Social Needs    Financial resource strain: Not on file    Food insecurity     Worry: Not on file     Inability: Not on file    Transportation needs     Medical: Not on file     Non-medical: Not on file   Tobacco Use    Smoking status: Former Smoker     Packs/day: 0.50     Years: 20.00     Pack years: 10.00     Last attempt to quit: 2001     Years since quittin.7    Smokeless tobacco: Never Used    Tobacco comment: quit in    Substance and Sexual Activity    Alcohol use: No     Comment: daily pop    Drug use: No    Sexual activity: Not on file   Lifestyle    Physical activity     Days per week: Not on file     Minutes per session: Not on file    Stress: Not on file   Relationships    Social connections     Talks on phone: Not on file     Gets together: Not on file     Attends [employer] service: Not on file     Active member of club or organization: Not on file     Attends meetings of clubs or organizations: Not on file     Relationship status: Not on file    Intimate partner violence     Fear of current or ex partner: Not on file     Emotionally abused: Not on file     Physically abused: Not on file     Forced sexual activity: Not on file   Other Topics Concern    Not on file   Social History Narrative    Not on file     History reviewed. No pertinent family history. Vitals:    10/03/20 1930 10/03/20 2300 10/04/20 0342 10/04/20 0740   BP: 135/75 (!) 119/56 126/70 131/79   Pulse: 84 80 77 78   Resp: 18 16 16 16   Temp: 97.1 °F (36.2 °C) 97 °F (36.1 °C) 97 °F (36.1 °C) 97.2 °F (36.2 °C)   TempSrc: Temporal Temporal Temporal Temporal   SpO2: 94% 94% 98% 92%   Weight:       Height:               Intake/Output Summary (Last 24 hours) at 10/4/2020 0904  Last data filed at 10/4/2020 1646  Gross per 24 hour   Intake 742.33 ml   Output 502 ml   Net 240.33 ml         Recent Labs     10/01/20  1302   WBC 7.3   HGB 12.8   HCT 39.7         Recent Labs     10/01/20  1302 10/02/20  0540 10/03/20  0751   BUN 8 8 13   CREATININE 0.6 0.6 0.7         ROS:   Negative for CP, palpitations, SOB at rest, dizziness/lightheadedness. Physical Exam   Constitutional: Oriented to person, place, and time. Appears well-developed. No distress. Cardiovascular: Normal rate, regular rhythm and normal heart sounds. Pulmonary/Chest: Effort normal. No respiratory distress. SQ air resolving  Abdominal: Soft. Bowel sounds are normal.   Musculoskeletal: Normal range of motion. Neurological: alert and oriented to person, place, and time. Skin: Skin is warm and dry. Psychiatric: normal mood and affect. A/P:  1)  PTX  -unfortunately CT fell out last night as she got OOB- CXR showed rt PTX  -currently she is not symptomatic  - d/w dr Anatoly Johansen; will await am CXR and defer to pulm if new tube needs to be replaced        Note: 25 minutes was spent providing face-to-face patient care, including:  and coordinating care, reviewing the chart, labs, and diagnostics, as well as medical decision making. Greater than 50% of this time was spent instructing and counseling the patient face to face regarding findings and recommendations.

## 2020-10-05 ENCOUNTER — APPOINTMENT (OUTPATIENT)
Dept: GENERAL RADIOLOGY | Age: 70
DRG: 242 | End: 2020-10-05
Attending: INTERNAL MEDICINE
Payer: MEDICARE

## 2020-10-05 ENCOUNTER — APPOINTMENT (OUTPATIENT)
Dept: CT IMAGING | Age: 70
DRG: 242 | End: 2020-10-05
Attending: INTERNAL MEDICINE
Payer: MEDICARE

## 2020-10-05 LAB
ANION GAP SERPL CALCULATED.3IONS-SCNC: 11 MMOL/L (ref 7–16)
BUN BLDV-MCNC: 11 MG/DL (ref 8–23)
CALCIUM SERPL-MCNC: 8.8 MG/DL (ref 8.6–10.2)
CHLORIDE BLD-SCNC: 107 MMOL/L (ref 98–107)
CO2: 26 MMOL/L (ref 22–29)
CREAT SERPL-MCNC: 0.6 MG/DL (ref 0.5–1)
GFR AFRICAN AMERICAN: >60
GFR NON-AFRICAN AMERICAN: >60 ML/MIN/1.73
GLUCOSE BLD-MCNC: 98 MG/DL (ref 74–99)
MAGNESIUM: 2.1 MG/DL (ref 1.6–2.6)
POTASSIUM SERPL-SCNC: 3.8 MMOL/L (ref 3.5–5)
SODIUM BLD-SCNC: 144 MMOL/L (ref 132–146)

## 2020-10-05 PROCEDURE — 71250 CT THORAX DX C-: CPT

## 2020-10-05 PROCEDURE — 6370000000 HC RX 637 (ALT 250 FOR IP): Performed by: INTERNAL MEDICINE

## 2020-10-05 PROCEDURE — 71045 X-RAY EXAM CHEST 1 VIEW: CPT

## 2020-10-05 PROCEDURE — 2580000003 HC RX 258: Performed by: INTERNAL MEDICINE

## 2020-10-05 PROCEDURE — 36415 COLL VENOUS BLD VENIPUNCTURE: CPT

## 2020-10-05 PROCEDURE — 83735 ASSAY OF MAGNESIUM: CPT

## 2020-10-05 PROCEDURE — 99024 POSTOP FOLLOW-UP VISIT: CPT | Performed by: INTERNAL MEDICINE

## 2020-10-05 PROCEDURE — 99233 SBSQ HOSP IP/OBS HIGH 50: CPT | Performed by: INTERNAL MEDICINE

## 2020-10-05 PROCEDURE — 94640 AIRWAY INHALATION TREATMENT: CPT

## 2020-10-05 PROCEDURE — 2060000000 HC ICU INTERMEDIATE R&B

## 2020-10-05 PROCEDURE — 80048 BASIC METABOLIC PNL TOTAL CA: CPT

## 2020-10-05 PROCEDURE — 99232 SBSQ HOSP IP/OBS MODERATE 35: CPT | Performed by: THORACIC SURGERY (CARDIOTHORACIC VASCULAR SURGERY)

## 2020-10-05 RX ADMIN — Medication 10 ML: at 09:16

## 2020-10-05 RX ADMIN — PIOGLITAZONE 15 MG: 15 TABLET ORAL at 09:16

## 2020-10-05 RX ADMIN — LORAZEPAM 1 MG: 1 TABLET ORAL at 20:29

## 2020-10-05 RX ADMIN — IPRATROPIUM BROMIDE AND ALBUTEROL SULFATE 1 AMPULE: .5; 3 SOLUTION RESPIRATORY (INHALATION) at 11:32

## 2020-10-05 RX ADMIN — LORAZEPAM 1 MG: 1 TABLET ORAL at 09:16

## 2020-10-05 RX ADMIN — IPRATROPIUM BROMIDE AND ALBUTEROL SULFATE 1 AMPULE: .5; 3 SOLUTION RESPIRATORY (INHALATION) at 16:12

## 2020-10-05 RX ADMIN — LORATADINE 10 MG: 10 TABLET ORAL at 09:16

## 2020-10-05 RX ADMIN — Medication 10 ML: at 20:30

## 2020-10-05 RX ADMIN — IPRATROPIUM BROMIDE AND ALBUTEROL SULFATE 1 AMPULE: .5; 3 SOLUTION RESPIRATORY (INHALATION) at 08:12

## 2020-10-05 RX ADMIN — FUROSEMIDE 20 MG: 20 TABLET ORAL at 09:16

## 2020-10-05 RX ADMIN — MULTIPLE VITAMINS W/ MINERALS TAB 1 TABLET: TAB at 09:16

## 2020-10-05 RX ADMIN — SIMVASTATIN 10 MG: 10 TABLET, FILM COATED ORAL at 20:29

## 2020-10-05 RX ADMIN — IPRATROPIUM BROMIDE AND ALBUTEROL SULFATE 1 AMPULE: .5; 3 SOLUTION RESPIRATORY (INHALATION) at 20:39

## 2020-10-05 ASSESSMENT — PAIN SCALES - GENERAL
PAINLEVEL_OUTOF10: 0

## 2020-10-05 NOTE — PROGRESS NOTES
Cardiac Electrophysiology Inpatient Progress Note    Kevyn Dael  1950  Date of Service: 10/5/2020  PCP: MD Zelda Coles MD  Electrophysiologist: Frieda Pritchard MD        Subjective: Kevyn Dale is seen in hospital follow-up and management of: CRT-P generator replacement and new right atrial pacemaker lead. 09/30/20: Kevyn Dale is seen in hospital follow-up she under went generator replacement and placement of a new right atrial lead  With the capping and abandonment of her prior atrial lead. This morning she had decreased breath sounds and was tachycardiac. A chest X ray showed a large right sided pneumothorax with slight mediastinal shift to the left. Dr. Delphine Mendosa was consulted and urgently placed a right sided chest tube/pig tail cathter post placement of the chest tube aidee reported improved respirations. Her device function is normal on post op day 1, her dressing is clean dry and intact the device site is without edema or ecchymosis. 10/1/2020: Kevyn Dale is seen in hospital follow-up, she remains AS-VS. She complains of right sided chest pain and notes improved dyspnea. Her chest tube is now to water seal and the AM chest X ray showed no Pneumothorax. 10/02/2020: Kevyn Dale us seen in hospital follow-up, her chest tube was clamped with a repeat chest xray showing significant Sub Q emphysema and possible small apical pneumothorax. She complaints of only mild dyspnea and right sided chest pain near the chest tube insertion site. 10/3/2020: Kevyn Dale is seen in the hospital for follow up after pacemaker generator change and new RA lead placement complicated with pneumothorax required chest tube placement. CTS was consulted yesterday due to subcutaneous emphysema after CT was clamped. She states that pain is better control. No arrhythmia overnight. 10/4/20: Kevyn Dale is seen in the hospital for follow up.  Her chest tube accidentally came out last night when she got out of bed. Chest-x-ray last night showed moderate right sided pneumothorax. Chest x-ray this AM is pending. 10/5/20 : her chest tube fell out.  She is for CT scan to assess pneumothorax today  Patient Active Problem List   Diagnosis    H/O blood clots    Sick sinus syndrome (HCC)    Sinus arrest    Heart murmur    Status post placement of cardiac pacemaker    Dyslipidemia    Controlled type 2 diabetes mellitus with complication, without long-term current use of insulin (HCC)    S/P placement of cardiac pacemaker    Elective replacement indicated for cardiac pacemaker battery at end of lifespan    Pneumothorax         Scheduled Medications:   ipratropium-albuterol  1 ampule Inhalation Q4H WA    [Held by provider] aspirin  81 mg Oral Daily    LORazepam  1 mg Oral TID    loratadine  10 mg Oral Daily    therapeutic multivitamin-minerals  1 tablet Oral Daily    pioglitazone  15 mg Oral Daily    simvastatin  10 mg Oral Nightly    furosemide  20 mg Oral Daily    sodium chloride flush  10 mL Intravenous 2 times per day       Infusion Medications:      PRN Medications:  ketorolac, fentanNYL, sodium chloride flush, acetaminophen    Allergies   Allergen Reactions    Cefuroxime Axetil     Codeine     Haldol [Haloperidol]     Rocephin [Ceftriaxone Sodium-Lidocaine]        Wt Readings from Last 3 Encounters:   09/29/20 190 lb (86.2 kg)   06/30/20 195 lb (88.5 kg)   11/12/19 185 lb (83.9 kg)     Temp Readings from Last 3 Encounters:   10/05/20 96.1 °F (35.6 °C) (Temporal)   08/17/15 97.7 °F (36.5 °C)   03/12/14 98.2 °F (36.8 °C)     BP Readings from Last 3 Encounters:   10/05/20 (!) 140/71   09/29/20 93/78   06/30/20 128/76     Pulse Readings from Last 3 Encounters:   10/05/20 88   06/30/20 98   11/12/19 91         Intake/Output Summary (Last 24 hours) at 10/5/2020 1138  Last data filed at 10/5/2020 0813  Gross per 24 hour   Intake 240 ml   Output --   Net 240 ml       ROS: Constitutional: Negative for fever. Positive for activity change and negative for appetite change. HENT: Negative for epistaxis. Eyes: Negative for diploplia, blurred vision. Respiratory: Negative for cough, chest tightness,  and wheezing. Shortness of breath improved. Cardiovascular: pertinent positives in HPI  Gastrointestinal: Negative for abdominal pain and blood in stool. All other review of systems are negative     PHYSICAL EXAM:  Vitals:    10/04/20 1500 10/04/20 1945 10/04/20 2300 10/05/20 0900   BP: 130/79 116/61 (!) 109/56 (!) 140/71   Pulse: 79 99 88 88   Resp: 16 18 16 16   Temp: 98.3 °F (36.8 °C) 96.8 °F (36 °C) 97 °F (36.1 °C) 96.1 °F (35.6 °C)   TempSrc: Temporal Temporal Temporal Temporal   SpO2: 97% 93% 94% 91%   Weight:       Height:       Constitutional: Oriented to person, place, and time. Well-developed and cooperative. Head: Normocephalic and atraumatic. Eyes: Conjunctivae are normal.  Neck: No  JVD present. Cardiovascular: S1 normal, S2 normal  A regular rhythm present. Pulmonary/Chest: Effort normal and breath sounds normal. No respiratory distress. Abdominal: Soft. Normal appearance   Musculoskeletal: Normal range of motion of all extremities, no muscle weakness. Neurological: Alert and oriented to person, place, and time. Skin: Skin is warm and dry. No bruising, no ecchymosis and no rash noted. Extremity: No clubbing or cyanosis. No edema. Psychiatric: Normal mood and affect. Thought content normal.       Pertinent Labs:  CBC:   Recent Labs     10/04/20  0836   WBC 5.7   HGB 12.4   HCT 38.4        BMP:  Recent Labs     10/03/20  0751 10/04/20  0836 10/05/20  0627    142 144   K 3.5 3.5 3.8    105 107   CO2 25 24 26   BUN 13 17 11   CREATININE 0.7 0.6 0.6   CALCIUM 9.1 8.7 8.8     ABGs: No results found for: PHART, PO2ART, LSP6EKG  INR: No results for input(s): INR in the last 72 hours. BNP: No results for input(s): BNP in the last 72 hours. TSH:   Lab Results   Component Value Date    TSH 2.395 2013      Cardiac Injury Profile: No results for input(s): CKTOTAL, CKMB, CKMBINDEX, TROPONINI in the last 72 hours. Lipid Profile:   Lab Results   Component Value Date    TRIG 152 2014    HDL 31 2014    LDLCALC 110 2014    CHOL 171 2014      Hemoglobin A1C: No components found for: HGBA1C   Xray: Xr Chest Portable    Result Date: 2020  Patient MRN: [id number] : 1950 Age:  78 years Gender: Female Order Date: 2020 11:20 AM Exam: XR CHEST PORTABLE Number of Images: 1 view Indication:  Follow-up right pneumothorax after placement of pigtail chest drain. Comparison: 2020, 0811 hours. FINDINGS: There is now a pigtail chest drain present on the right. The right pneumothorax has resolved. The transvenous pacemaker is stable. There are no new abnormal findings. Resolution of right pneumothorax after placement of chest drain. Xr Chest Portable    Result Date: 2020  Patient MRN:  [id number] : 1950 Age: 78 years Gender: Female Order Date:  2020 8:00 AM EXAM: XR CHEST PORTABLE INDICATION:  post  pacemaker post  pacemaker COMPARISON: 2020 FINDINGS:  There is been placement of a right-sided pacer. There is a large right-sided pneumothorax with questionable slight mediastinal shift to the left. Heart size is normal. Left lung is clear. Large right-sided pneumothorax with suggestion of slight mediastinal shift to the left CRITICAL FINDING: Results were called to Dr. Arin Benitez on 2010 at 0930 hours, at time of image presentation     Xr Chest Portable    Result Date: 2020  Patient MRN: [id number] : 1950 Age:  78 years Gender: Female Order Date: 2020 1:12 PM Exam: XR CHEST PORTABLE Number of Images: 1 view Indication:   Pacemaker placement and rule out pneumothorax Pacemaker placement and rule out pneumothorax Comparison: Prior study from 2010 is available. Findings: The lungs are clear. There is no evidence of pulmonary infiltrate or pleural effusion. The pulmonary vascularity is unremarkable. The cardiac, hilar and mediastinal silhouettes are satisfactory. There is a right-sided pacemaker with lead in right atrium and right ventricle. There is uncoiling and atherosclerotic change of thoracic aorta. The bony thorax demonstrates no gross abnormality. The study is unchanged from prior study. NO ACUTE CARDIOPULMONARY PROCESS     Pertinent Cardiac Testing:    Echo 09/30/20  Findings      Left Ventricle   Normal left ventricular chamber size. Normal left ventricular systolic function. Visually estimated LVEF is 55-60%. Stage I diastolic dysfunction. Normal left atrial pressure. Right Ventricle   Normal right ventricle size and function. Pacer lead in RV. Left Atrium   Left atrium is of normal size. Interatrial septum not well visualized but appears intact. Right Atrium   Normal right atrium. Mitral Valve   Normal mitral valve structure and function. No mitral valve prolapse. Tricuspid Valve   Normal tricuspid valve structure. There is trace tricuspid regurgitation, RVSP 24mmHg. Aortic Valve   The aortic valve appears mildly sclerotic. No hemodynamically significant aortic stenosis is present. Pulmonic Valve   The pulmonic valve was not well visualized. Pericardial Effusion   No evidence of pericardial effusion. Pericardium appears normal.      Pleural Effusion   No evidence of pleural effusion. Aorta   Normal aortic root size and ascending aorta. Miscellaneous   The inferior vena cava diameter is normal with normal respiratory   variation. Conclusions      Summary   Normal left ventricular chamber size. Normal left ventricular systolic function, LVEF is 55-60%. Stage I diastolic dysfunction. Normal right ventricle size and function. Pacer lead in RV.    The aortic valve appears mildly TR Gradient:18.52 mmHg   MV Deceleration      cm^2                  PV Peak Velocity: 0.8 m/s   Time: 217.3 msec                           PV Peak Gradient: 2.59 mmHg   MV P1/2t: 50.5 msec  LVOT VTI: 16.4 cm     PV Mean Velocity: 0.49 m/s   MVA by PHT:4.36 cm^2                       PV Mean Gradient: 1.2 mmHg   MV Area              Estimated PASP: 21.52   (continuity): 4.1    mmHg   cm^2     [email or web address] [id number]      Igohxiiog83/5/2020:  NSR with a rate of 80's bpm     ECG 09/29/2020: Normal sinus rhythm rate 78 bpmm QRS 94, QTc 453ms     Device Interrogation 09/30/20:  Make/Model [employer] MRI   Mode DDDR 70/130  P wave: >5.0 mV  Impedance: 760 ohms   Threshold: 0.5 V @ 0.5 ms  RV R wave: 8.1 mV  Impedance: 580 ohms   Threshold: 1.25 V @ 1.0 ms  Pacing: A: <1%  RV: <1%  Battery Voltage/Longevity:  4.6-11.4 years     Arrhythmias: none   Reprogramming included none   Overall device function is normal    All device programmable settings were evaluated per above and in the scanned document, along with iterative adjustments (capture thresholds) to assess and select the most appropriate final programming to provide for consistent delivery of the appropriate therapy and to verify function of the device. I have independently reviewed all of the ECGs and rhythm strips per above. I have personally reviewed the laboratory, cardiac diagnostic and radiographic testing as outlined above. I have reviewed previous records noted in [address] [place]. Impression:    1.  Pacemaker in situ  - [address] [facility] at [facility]  - right sided   - DOI 2/1994   - PGR 2010   - Retained atrial stylet with recommend yearly fluoroscopy per  advisory   - Previously high atrial lead impedence   - Prior Atrial lead abandoned NOT MRI compatible  - New atrial lead placed, PGR 09/29/20  - Post op pneumothorax treated with pigtail chest tube     2. Pneumothorax   - Status post placement of a R sided pigtail chest tube 9/30/20  - Chest tube back to suction 10/2/20 after she developed subcutaneous emphysema. - Chest tube accidentally pulled out 10/4/20.  - CTS and Pulmonary are following. 3. Sick sinus syndrome   - Status post pacemaker     4. Hyperlipidemia   -  On Zocor    5. History of left mastectomy     6. Diabetes mellitus   - On Actos. 7. Anxiety  - On ativan      Recommendations:    1. Normal pacemaker function   2. Management of right sided pneumothorax per Pulmonary and CTS      Thank you for allowing me to participate in your patient's care. I have spent a total of 35 minutes with the patient and the family reviewing the above stated recommendations. And a total of >50% of that time involved face-to-face time providing counseling and or coordination of care with the other providers.     Glen Hill MD  Cardiac Electrophysiology  [facility]) Physicians  [facility] [place]: [facility]  11:38 AM  10/5/2020

## 2020-10-05 NOTE — CARE COORDINATION
Met with pt at bedside to discuss discharge / transition of care plan. Pt reports from home with long time s/o Willie Harper, independent of all ADL, active  (currently on hold d/t surgery), verified pcp and pharmacy, denies DME or needs; plan is to return home when medically stable. Willie Hammondmstead to provide transportation at that time. Pt reports up ambulating independently in room. Spoke with pulmonologist, CT appears the same, observe overnight then, okay to discharge from their POV. Anticipate discharge within the next 24 hours. Discharge plan is home with no needs.

## 2020-10-05 NOTE — PLAN OF CARE
Problem: Cardiac:  Goal: Ability to maintain an adequate cardiac output will improve  Description: Ability to maintain an adequate cardiac output will improve  Outcome: Met This Shift     Problem: Pain:  Goal: Pain level will decrease  Description: Pain level will decrease  Outcome: Met This Shift     Problem: Skin Integrity:  Goal: Will show no infection signs and symptoms  Description: Will show no infection signs and symptoms  Outcome: Met This Shift     Problem: Skin Integrity:  Goal: Absence of new skin breakdown  Description: Absence of new skin breakdown  Outcome: Met This Shift

## 2020-10-05 NOTE — PROGRESS NOTES
10.00     Last attempt to quit: 2001     Years since quittin.7    Smokeless tobacco: Never Used    Tobacco comment: quit in    Substance and Sexual Activity    Alcohol use: No     Comment: daily pop    Drug use: No    Sexual activity: Not on file   Lifestyle    Physical activity     Days per week: Not on file     Minutes per session: Not on file    Stress: Not on file   Relationships    Social connections     Talks on phone: Not on file     Gets together: Not on file     Attends Mosque service: Not on file     Active member of club or organization: Not on file     Attends meetings of clubs or organizations: Not on file     Relationship status: Not on file    Intimate partner violence     Fear of current or ex partner: Not on file     Emotionally abused: Not on file     Physically abused: Not on file     Forced sexual activity: Not on file   Other Topics Concern    Not on file   Social History Narrative    Not on file     History reviewed. No pertinent family history. Vitals:    10/04/20 0740 10/04/20 1500 10/04/20 1945 10/04/20 2300   BP: 131/79 130/79 116/61 (!) 109/56   Pulse: 78 79 99 88   Resp: 16 16 18 16   Temp: 97.2 °F (36.2 °C) 98.3 °F (36.8 °C) 96.8 °F (36 °C) 97 °F (36.1 °C)   TempSrc: Temporal Temporal Temporal Temporal   SpO2: 92% 97% 93% 94%   Weight:       Height:             No intake or output data in the 24 hours ending 10/05/20 0915      Recent Labs     10/04/20  0836   WBC 5.7   HGB 12.4   HCT 38.4         Recent Labs     10/03/20  0751 10/04/20  0836 10/05/20  0627   BUN 13 17 11   CREATININE 0.7 0.6 0.6         ROS:   Negative for CP, palpitations, SOB at rest, dizziness/lightheadedness. Physical Exam   Constitutional: Oriented to person, place, and time. Appears well-developed. No distress. Cardiovascular: Normal rate, regular rhythm and normal heart sounds. Pulmonary/Chest: Effort normal. No respiratory distress. Abdominal: Soft.  Bowel sounds are normal.   Musculoskeletal: Normal range of motion. Neurological: alert and oriented to person, place, and time. Skin: Skin is warm and dry. Psychiatric: normal mood and affect. A/P: PTX  - CT fell out sat night  -pt is comfortable and on RA  - per dr Hannah Arcos will obtain CT chest today to evaluate for any worsening of PTX        Note: 25 minutes was spent providing face-to-face patient care, including:  and coordinating care, reviewing the chart, labs, and diagnostics, as well as medical decision making. Greater than 50% of this time was spent instructing and counseling the patient face to face regarding findings and recommendations. As above. CXR stable. Hope for conservative management.   David Velasquez

## 2020-10-06 ENCOUNTER — APPOINTMENT (OUTPATIENT)
Dept: GENERAL RADIOLOGY | Age: 70
DRG: 242 | End: 2020-10-06
Attending: INTERNAL MEDICINE
Payer: MEDICARE

## 2020-10-06 VITALS
HEIGHT: 65 IN | BODY MASS INDEX: 31.65 KG/M2 | WEIGHT: 190 LBS | RESPIRATION RATE: 14 BRPM | SYSTOLIC BLOOD PRESSURE: 134 MMHG | DIASTOLIC BLOOD PRESSURE: 78 MMHG | TEMPERATURE: 97.6 F | OXYGEN SATURATION: 94 % | HEART RATE: 83 BPM

## 2020-10-06 LAB
ANION GAP SERPL CALCULATED.3IONS-SCNC: 12 MMOL/L (ref 7–16)
BUN BLDV-MCNC: 8 MG/DL (ref 8–23)
CALCIUM SERPL-MCNC: 8.5 MG/DL (ref 8.6–10.2)
CHLORIDE BLD-SCNC: 108 MMOL/L (ref 98–107)
CO2: 25 MMOL/L (ref 22–29)
CREAT SERPL-MCNC: 0.6 MG/DL (ref 0.5–1)
GFR AFRICAN AMERICAN: >60
GFR NON-AFRICAN AMERICAN: >60 ML/MIN/1.73
GLUCOSE BLD-MCNC: 94 MG/DL (ref 74–99)
MAGNESIUM: 2.2 MG/DL (ref 1.6–2.6)
POTASSIUM SERPL-SCNC: 3.8 MMOL/L (ref 3.5–5)
SODIUM BLD-SCNC: 145 MMOL/L (ref 132–146)

## 2020-10-06 PROCEDURE — 2580000003 HC RX 258: Performed by: INTERNAL MEDICINE

## 2020-10-06 PROCEDURE — 6370000000 HC RX 637 (ALT 250 FOR IP): Performed by: INTERNAL MEDICINE

## 2020-10-06 PROCEDURE — 36415 COLL VENOUS BLD VENIPUNCTURE: CPT

## 2020-10-06 PROCEDURE — 83735 ASSAY OF MAGNESIUM: CPT

## 2020-10-06 PROCEDURE — 99232 SBSQ HOSP IP/OBS MODERATE 35: CPT | Performed by: THORACIC SURGERY (CARDIOTHORACIC VASCULAR SURGERY)

## 2020-10-06 PROCEDURE — 80048 BASIC METABOLIC PNL TOTAL CA: CPT

## 2020-10-06 PROCEDURE — 94640 AIRWAY INHALATION TREATMENT: CPT

## 2020-10-06 PROCEDURE — 99024 POSTOP FOLLOW-UP VISIT: CPT | Performed by: INTERNAL MEDICINE

## 2020-10-06 PROCEDURE — 71045 X-RAY EXAM CHEST 1 VIEW: CPT

## 2020-10-06 RX ADMIN — Medication 10 ML: at 08:32

## 2020-10-06 RX ADMIN — IPRATROPIUM BROMIDE AND ALBUTEROL SULFATE 1 AMPULE: .5; 3 SOLUTION RESPIRATORY (INHALATION) at 11:37

## 2020-10-06 RX ADMIN — FUROSEMIDE 20 MG: 20 TABLET ORAL at 08:32

## 2020-10-06 RX ADMIN — MULTIPLE VITAMINS W/ MINERALS TAB 1 TABLET: TAB at 08:32

## 2020-10-06 RX ADMIN — LORATADINE 10 MG: 10 TABLET ORAL at 08:32

## 2020-10-06 RX ADMIN — PIOGLITAZONE 15 MG: 15 TABLET ORAL at 08:32

## 2020-10-06 RX ADMIN — IPRATROPIUM BROMIDE AND ALBUTEROL SULFATE 1 AMPULE: .5; 3 SOLUTION RESPIRATORY (INHALATION) at 08:39

## 2020-10-06 RX ADMIN — LORAZEPAM 1 MG: 1 TABLET ORAL at 08:32

## 2020-10-06 ASSESSMENT — PAIN SCALES - GENERAL
PAINLEVEL_OUTOF10: 0

## 2020-10-06 NOTE — PROGRESS NOTES
Cardiac Electrophysiology Inpatient Progress Note    John Adler  1950  Date of Service: 10/6/2020  PCP: MD Shanda Damico MD  Electrophysiologist: Kadeem Bynum MD        Subjective: John Adler is seen in hospital follow-up and management of: CRT-P generator replacement and new right atrial pacemaker lead. 09/30/20: John Adler is seen in hospital follow-up she under went generator replacement and placement of a new right atrial lead  With the capping and abandonment of her prior atrial lead. This morning she had decreased breath sounds and was tachycardiac. A chest X ray showed a large right sided pneumothorax with slight mediastinal shift to the left. Dr. Jacklyn Daigle was consulted and urgently placed a right sided chest tube/pig tail cathter post placement of the chest tube aidee reported improved respirations. Her device function is normal on post op day 1, her dressing is clean dry and intact the device site is without edema or ecchymosis. 10/1/2020: John Adler is seen in hospital follow-up, she remains AS-VS. She complains of right sided chest pain and notes improved dyspnea. Her chest tube is now to water seal and the AM chest X ray showed no Pneumothorax. 10/02/2020: John Adler us seen in hospital follow-up, her chest tube was clamped with a repeat chest xray showing significant Sub Q emphysema and possible small apical pneumothorax. She complaints of only mild dyspnea and right sided chest pain near the chest tube insertion site. 10/3/2020: John Adler is seen in the hospital for follow up after pacemaker generator change and new RA lead placement complicated with pneumothorax required chest tube placement. CTS was consulted yesterday due to subcutaneous emphysema after CT was clamped. She states that pain is better control. No arrhythmia overnight. 10/4/20: John Adler is seen in the hospital for follow up.  Her chest tube accidentally came out last night when she got out of bed. Chest-x-ray last night showed moderate right sided pneumothorax. Chest x-ray this AM is pending. 10/5/20 : her chest tube fell out. She is for CT scan to assess   pneumothorax today    10/6/20:Rabia Solomon is seen in hospital follow-up. She is sitting up in a chair receiving a breathing treatment. She is feeling well and offers no complaints. A CXR last evening showed a stable right apical pnemothorax and mild right SQ emphysema; a repeat CXR is pending this am per Pulmonary.        Patient Active Problem List   Diagnosis    H/O blood clots    [facility]ick [facility] syndrome ([facility])    Sinus arrest    Heart murmur    Status post placement of cardiac pacemaker    Dyslipidemia    Controlled type 2 diabetes mellitus with complication, without long-term current use of insulin ([facility])    S/P placement of cardiac pacemaker    Elective replacement indicated for cardiac pacemaker battery at end of lifespan    Pneumothorax         Scheduled Medications:   ipratropium-albuterol  1 ampule Inhalation Q4H WA    [Held by provider] aspirin  81 mg Oral Daily    LORazepam  1 mg Oral TID    loratadine  10 mg Oral Daily    therapeutic multivitamin-minerals  1 tablet Oral Daily    pioglitazone  15 mg Oral Daily    simvastatin  10 mg Oral Nightly    furosemide  20 mg Oral Daily    sodium chloride flush  10 mL Intravenous 2 times per day         PRN Medications:  ketorolac, fentanNYL, sodium chloride flush, acetaminophen    Allergies   Allergen Reactions    Cefuroxime Axetil     Codeine     Haldol [Haloperidol]     Rocephin [Ceftriaxone Sodium-Lidocaine]        Wt Readings from Last 3 Encounters:   09/29/20 190 lb (86.2 kg)   06/30/20 195 lb (88.5 kg)   11/12/19 185 lb (83.9 kg)     Temp Readings from Last 3 Encounters:   10/06/20 97.8 °F (36.6 °C) (Temporal)   08/17/15 97.7 °F (36.5 °C)   03/12/14 98.2 °F (36.8 °C)     BP Readings from Last 3 Encounters:   10/06/20 139/82   09/29/20 93/78   06/30/20 3.8 3.8    107 108*   CO2 24 26 25   BUN 17 11 8   CREATININE 0.6 0.6 0.6   CALCIUM 8.7 8.8 8.5*     ABGs: No results found for: PHART, PO2ART, OGS9PED  INR: No results for input(s): INR in the last 72 hours. BNP: No results for input(s): BNP in the last 72 hours. TSH:   Lab Results   Component Value Date    TSH 2.395 2013      Cardiac Injury Profile: No results for input(s): CKTOTAL, CKMB, CKMBINDEX, TROPONINI in the last 72 hours. Lipid Profile:   Lab Results   Component Value Date    TRIG 152 2014    HDL 31 2014    LDLCALC 110 2014    CHOL 171 2014      Hemoglobin A1C: No components found for: HGBA1C   Xray: Xr Chest Portable    Result Date: 2020  Patient MRN: [id number] : 1950 Age:  78 years Gender: Female Order Date: 2020 11:20 AM Exam: XR CHEST PORTABLE Number of Images: 1 view Indication:  Follow-up right pneumothorax after placement of pigtail chest drain. Comparison: 2020, 0811 hours. FINDINGS: There is now a pigtail chest drain present on the right. The right pneumothorax has resolved. The transvenous pacemaker is stable. There are no new abnormal findings. Resolution of right pneumothorax after placement of chest drain. Xr Chest Portable    Result Date: 2020  Patient MRN:  [id number] : 1950 Age: 78 years Gender: Female Order Date:  2020 8:00 AM EXAM: XR CHEST PORTABLE INDICATION:  post  pacemaker post  pacemaker COMPARISON: 2020 FINDINGS:  There is been placement of a right-sided pacer. There is a large right-sided pneumothorax with questionable slight mediastinal shift to the left. Heart size is normal. Left lung is clear.      Large right-sided pneumothorax with suggestion of slight mediastinal shift to the left CRITICAL FINDING: Results were called to Dr. Wyatt Palmer on 2010 at 0930 hours, at time of image presentation     Xr Chest Portable    Result Date: 2020  Patient MRN: [id number] : 1950 Age:  78 years Gender: Female Order Date: 9/29/2020 1:12 PM Exam: XR CHEST PORTABLE Number of Images: 1 view Indication:   Pacemaker placement and rule out pneumothorax Pacemaker placement and rule out pneumothorax Comparison: Prior study from 04/30/2010 is available. Findings: The lungs are clear. There is no evidence of pulmonary infiltrate or pleural effusion. The pulmonary vascularity is unremarkable. The cardiac, hilar and mediastinal silhouettes are satisfactory. There is a right-sided pacemaker with lead in right atrium and right ventricle. There is uncoiling and atherosclerotic change of thoracic aorta. The bony thorax demonstrates no gross abnormality. The study is unchanged from prior study. NO ACUTE CARDIOPULMONARY PROCESS     Pertinent Cardiac Testing:    Echo 09/30/20  Findings      Left Ventricle   Normal left ventricular chamber size. Normal left ventricular systolic function. Visually estimated LVEF is 55-60%. Stage I diastolic dysfunction. Normal left atrial pressure. Right Ventricle   Normal right ventricle size and function. Pacer lead in RV. Left Atrium   Left atrium is of normal size. Interatrial septum not well visualized but appears intact. Right Atrium   Normal right atrium. Mitral Valve   Normal mitral valve structure and function. No mitral valve prolapse. Tricuspid Valve   Normal tricuspid valve structure. There is trace tricuspid regurgitation, RVSP 24mmHg. Aortic Valve   The aortic valve appears mildly sclerotic. No hemodynamically significant aortic stenosis is present. Pulmonic Valve   The pulmonic valve was not well visualized. Pericardial Effusion   No evidence of pericardial effusion. Pericardium appears normal.      Pleural Effusion   No evidence of pleural effusion. Aorta   Normal aortic root size and ascending aorta.    Miscellaneous   The inferior vena cava diameter is normal with normal respiratory Estimated RVSP: 21.5 mmHg   2.1 mmHg             AV Mean Gradient: 2.1 Estimated RAP:3 mmHg   MV Mean Gradient:    mmHg   0.9 mmHg             AV VTI: 20.6 cm   MV Mean Velocity:    AV Area               TR Velocity:2.15 m/s   0.49 m/s             (Continuity):3.31     TR Gradient:18.52 mmHg   MV Deceleration      cm^2                  PV Peak Velocity: 0.8 m/s   Time: 217.3 msec                           PV Peak Gradient: 2.59 mmHg   MV P1/2t: 50.5 msec  LVOT VTI: 16.4 cm     PV Mean Velocity: 0.49 m/s   MVA by PHT:4.36 cm^2                       PV Mean Gradient: 1.2 mmHg   MV Area              Estimated PASP: 21.52   (continuity): 4.1    mmHg   cm^2     [email or web address] [id number]      Neguewvym37/6/2020:  NSR with a rate of 80's bpm     ECG 09/29/2020: Normal sinus rhythm rate 78 bpmm QRS 94, QTc 453ms     Device Interrogation 09/30/20:  Make/Model [employer] MRI   Mode DDDR 70/130  P wave: >5.0 mV  Impedance: 760 ohms   Threshold: 0.5 V @ 0.5 ms  RV R wave: 8.1 mV  Impedance: 580 ohms   Threshold: 1.25 V @ 1.0 ms  Pacing: A: <1%  RV: <1%  Battery Voltage/Longevity:  4.6-11.4 years     Arrhythmias: none   Reprogramming included none   Overall device function is normal    All device programmable settings were evaluated per above and in the scanned document, along with iterative adjustments (capture thresholds) to assess and select the most appropriate final programming to provide for consistent delivery of the appropriate therapy and to verify function of the device. I have independently reviewed all of the ECGs and rhythm strips per above. I have personally reviewed the laboratory, cardiac diagnostic and radiographic testing as outlined above. I have reviewed previous records noted in [address]. Impression:    1.  Pacemaker in situ  - [address] [facility] at [facility]  - right sided   - DOI 2/1994   - PGR 2010   - Retained atrial stylet with recommend yearly fluoroscopy per  advisory   - Previously high atrial lead impedence   - Prior Atrial lead abandoned NOT MRI compatible  - New atrial lead placed, PGR 09/29/20  - Post op pneumothorax treated with pigtail chest tube   - Repeat CXR pending this am    2. Pneumothorax   - Status post placement of a R sided pigtail chest tube 9/30/20  - Chest tube back to suction 10/2/20 after she developed subcutaneous emphysema. - Chest tube accidentally pulled out 10/4/20.  - CTS and Pulmonary are following.  - Repeat CXR pending this am per Pulmonary Service. 3. Sick sinus syndrome   - Status post pacemaker     4. Hyperlipidemia   -  On Zocor    5. History of left mastectomy     6. Diabetes mellitus   - On Actos. 7. Anxiety  - On ativan      Recommendations:    1. No changes from an EP perspective. Normal PPM function. 2. Continue management of pneumothorax as per CTS and Pulmonary Services. Thank you for allowing me to participate in your patient's care. MARGUERITE Pena - NP- above discussed with Dr Gamble [place]  [facility]: [place] [facility]  9:57 AM  10/6/2020            Addendum    I personally saw, examined and provided care for the patient. Radiographs, labs and medication list were reviewed by me independently. The case was discussed in detail and plans for care were established. Review of Nurse Practitioner documentation was conducted and revisions were made as appropriate. I agree with the above documented exam, problem list and plan of care. - Await Pulm/CTS recommendations about discharge    I have spent a total of 35 minutes with the patient and his/her family reviewing the above stated recommendations.  A total of >50% of that time involved face-to-face time providing counseling and or coordination of care

## 2020-10-06 NOTE — PROGRESS NOTES
CLINICAL PHARMACY: DISCHARGE MED RECONCILIATION/REVIEW    [facility]) Select Patient?: No  Total # of Interventions Recommended: 0   -   Total # Interventions Accepted: 0  Intervention Severity:   - Level 1 Intervention Present?: No   - Level 2 #: 0   - Level 3 #: 0   Time Spent (min): 15    Additional Documentation: See progress note.

## 2020-10-06 NOTE — PROGRESS NOTES
[facility]   Department of Pharmacy   Pharmacist Transition of Care Services         Patient Demographics  Name:  Esther Saavedra   Medical Record Number:  [id number]  Gender:  female   Age:  78 y.o. YOB: 1950    Primary Care Physician: Zohaib Cooper MD  Primary Care Physician phone number:  [phone]  Readmission Risk (% from [facility] Patient List): 6%       Pharmacist Review and Summary of Medications     Date of last reviewed/update: 10/6/20     Category Comments   New Medication Started            Change in Outpatient Medication  (Dosage Form, Route,   Dose, or Frequency)          Discontinued Outpatient Medication   (or on Hold During Admission)          Other   No changes to medications           Pharmacist Patient Education:    Date  Person Educated Content of Education                 Documentation of Pharmacist Interventions and Follow-up Plan:     The following Pharmacist Transition of Care Services were completed:   [x]  Reviewed and summarized medication changes  []  Entire home medication list was reviewed for accuracy (sources: **)  []  Home medication list was updated or corrected     [x]  Reviewed discharge medication reconciliation  []  Discharge medication list was updated or corrected    [x]  Added information to AVS (may include timing of medications, next dose due, or counseling information)  []  Patient education was provided on new medications  []  Patient education was provided on medication changes  []  Reviewed the After Visit Summary (AVS) with patient    Additional Interventions:  []  Inpatient prescriber was contacted and the following pharmacy recommendations        were accepted: **     [] Other interventions: **        Pharmacist: David KellyD, [facility]  Date:  10/6/2020 4:00 PM

## 2020-10-06 NOTE — DISCHARGE SUMMARY
[facility] Cardiac Electrophysiology Discharge Summary    Viola Lozano  1950    78 y.o.  female  PCP: Faustino Arana MD    Admission Date:9/29/2020      Discharge Date:  10/06/20    Patient Active Problem List   Diagnosis    H/O blood clots    [facility]ick [facility] syndrome ([facility]    Sinus arrest    Heart murmur    Status post placement of cardiac pacemaker    Dyslipidemia    Controlled type 2 diabetes mellitus with complication, without long-term current use of insulin ([facility]    S/P placement of cardiac pacemaker    Elective replacement indicated for cardiac pacemaker battery at end of lifespan    Pneumothorax        [place], [address] [facility] Medication Instructions [id number]    Printed on:10/06/20 1311   Medication Information                      aspirin 81 MG tablet  Take 81 mg by mouth daily. furosemide (LASIX) 20 MG tablet  TAKE 1 TABLET DAILY             loratadine (CLARITIN) 10 MG tablet  Take 10 mg by mouth daily. LORazepam (ATIVAN) 1 MG tablet  Take 1 mg by mouth 3 times daily. Multiple Vitamins-Minerals (THERAPEUTIC MULTIVITAMIN-MINERALS) tablet  Take 1 tablet by mouth daily. pioglitazone (ACTOS) 15 MG tablet  TAKE 1 TABLET BY MOUTH EVERY DAY             simvastatin (ZOCOR) 10 MG tablet  TAKE 1 TABLET NIGHTLY                 Hospital Course: Viola Lozano is a 78year-old patient that follows with Dr Hina Candelaria and Dr Matt Crawford for her pacemaker. She has a history of high right atrial lead impedence with multiple noise reversions with plan for a new lead at time of pacemaker generator change or sooner if necessary. Her device triggered FIFI and she was scheduled for new right atrial lead implantation with capping of the old lead and pacemaker generator change 9/29/20 following an echocardiogram. POD #1 she developed tachycardia with associated decreased breath sounds. Her post-op CXR demonstrated a large right-sided pneumothorax with slight mediastinal shift to the left. The device function was normal. The inserted CT was clamped 10/2/20 and she developed SQ emphysema 2/2 an air leak. CTS was consulted for possible VATS/pleurodesis. The CT came out accidentally on 10/3/20 when she was getting into bed. The CAT scan of the chest showed mild pneumothorax with planned repeat CXR. There was no progression of the pneumothorax and she was pain free. Repeat CXR 10/6/20 demonstrated stable pneumothorax. She was discharged from Pulmonary and CTS perspectives. Procedures Performed: 9/30/20 urgent right-sided chest tube/pigtail catheter. CAT scan of the chest 10/4/20. Consultants: Dr Xavier Kwon (Pulmonary) and Dr Geoffrey Gaitan (CTS)    Disposition: The patient was discharged to home in stable condition on the above medications. Device clinic follow-up in our [facility] on 10/9/20 at 10 am.  Clinical follow-up in the office in 1 month with Dr Dalton Hilario. Patient to have repeat CXR in 3 days per Dr Xavier Kwon. Parth Hairston, APRN-CNP, AGCNS - above discussed with Dr Osullivan/Dr Brittany [facility]          Addendum    I personally saw, examined and provided care for the patient. Radiographs, labs and medication list were reviewed by me independently. The case was discussed in detail and plans for care were established. Review of Nurse Practitioner documentation was conducted and revisions were made as appropriate. I agree with the above documented exam, problem list and plan of care.             Saurabh Valdez M.D  [facility] Electrophysiology

## 2020-10-06 NOTE — PROGRESS NOTES
attempt to quit: 2001     Years since quittin.7    Smokeless tobacco: Never Used    Tobacco comment: quit in    Substance and Sexual Activity    Alcohol use: No     Comment: daily pop    Drug use: No    Sexual activity: Not on file   Lifestyle    Physical activity     Days per week: Not on file     Minutes per session: Not on file    Stress: Not on file   Relationships    Social connections     Talks on phone: Not on file     Gets together: Not on file     Attends [employer] service: Not on file     Active member of club or organization: Not on file     Attends meetings of clubs or organizations: Not on file     Relationship status: Not on file    Intimate partner violence     Fear of current or ex partner: Not on file     Emotionally abused: Not on file     Physically abused: Not on file     Forced sexual activity: Not on file   Other Topics Concern    Not on file   Social History Narrative    Not on file     History reviewed. No pertinent family history. Vitals:    10/05/20 1515 10/05/20 2030 10/05/20 2338 10/06/20 0745   BP: (!) 141/72 (!) 163/91 128/73 139/82   Pulse: 91 95 83 79   Resp: 18 21 12 14   Temp: 97.1 °F (36.2 °C) 96.6 °F (35.9 °C) 97.1 °F (36.2 °C) 97.8 °F (36.6 °C)   TempSrc: Temporal Temporal Temporal Temporal   SpO2: 92% 92% 91% 93%   Weight:       Height:               Intake/Output Summary (Last 24 hours) at 10/6/2020 0959  Last data filed at 10/6/2020 0849  Gross per 24 hour   Intake 1080 ml   Output 0 ml   Net 1080 ml         Recent Labs     10/04/20  0836   WBC 5.7   HGB 12.4   HCT 38.4         Recent Labs     10/04/20  0836 10/05/20  0627 10/06/20  0704   BUN 17 11 8   CREATININE 0.6 0.6 0.6         ROS:   Negative for CP, palpitations, SOB at rest, dizziness/lightheadedness. Physical Exam   Constitutional: Oriented to person, place, and time. Appears well-developed. No distress. Cardiovascular: Normal rate, regular rhythm and normal heart sounds. Pulmonary/Chest: Effort normal. No respiratory distress. Abdominal: Soft. Bowel sounds are normal.   Musculoskeletal: Normal range of motion. Neurological: alert and oriented to person, place, and time. Skin: Skin is warm and dry. Psychiatric: normal mood and affect. A/P:  1) Ptx  - chest tube fell out night of 10/3  - CXR yesterday stable with no SOB or pain  - CXR pending for today, if continues to remain stable plan is for DC  - Cont conservative management at this time         Note: 25 minutes was spent providing face-to-face patient care, including:  and coordinating care, reviewing the chart, labs, and diagnostics, as well as medical decision making. Greater than 50% of this time was spent instructing and counseling the patient face to face regarding findings and recommendations. Seen and examined. Awaiting CXR. Sub Q is much less. As long as CXR looks stable then ok for discharge from my standpoint.   Bibi Lee

## 2020-10-06 NOTE — PROGRESS NOTES
Pulmonary [address] [facility]                             Pulmonary Consult/Progress Note :        Reason for Consultation: Tension pneumothorax  CC : Worsening shortness of breath  HPI:   Doing a lot better  On 1 L  Does not have CP   Chest tube accidentally was put over weekend   Less  CP  NO SOB         PHYSICAL EXAMINATION:     VITAL SIGNS:  BP (!) 163/91   Pulse 95   Temp 96.6 °F (35.9 °C) (Temporal)   Resp 21   Ht 5' 5\" (1.651 m)   Wt 190 lb (86.2 kg)   SpO2 92%   BMI 31.62 kg/m²   Wt Readings from Last 3 Encounters:   09/29/20 190 lb (86.2 kg)   06/30/20 195 lb (88.5 kg)   11/12/19 185 lb (83.9 kg)     Temp Readings from Last 3 Encounters:   10/05/20 96.6 °F (35.9 °C) (Temporal)   08/17/15 97.7 °F (36.5 °C)   03/12/14 98.2 °F (36.8 °C)     TMAX:  BP Readings from Last 3 Encounters:   10/05/20 (!) 163/91   09/29/20 93/78   06/30/20 128/76     Pulse Readings from Last 3 Encounters:   10/05/20 95   06/30/20 98   11/12/19 91           INTAKE/OUTPUTS:  I/O last 3 completed shifts:   In: 240 [P.O.:240]  Out: -     Intake/Output Summary (Last 24 hours) at 10/5/2020 2255  Last data filed at 10/5/2020 2250  Gross per 24 hour   Intake 960 ml   Output 0 ml   Net 960 ml       General Appearance: alert and oriented to person, place and time, well-developed and   well-nourished, in no acute distress   Eyes: pupils equal, round, and reactive to light, extraocular eye movements intact, conjunctivae normal and sclera anicteric   Neck: neck supple and non tender without mass, no thyromegaly, no thyroid nodules and no cervical adenopathy   Pulmonary/Chest: Decreased breath sound in the right side  Cardiovascular: normal rate, regular rhythm, normal S1 and S2, no murmurs, rubs, clicks or gallops, distal pulses intact, no carotid bruits, no murmurs, no gallops, no carotid bruits and no JVD   Abdomen: obese, soft, non-tender, non-distended, normal bowel sounds, no masses or organomegaly Extremities: No edema or cyanosis  Musculoskeletal: normal range of motion, no joint swelling, deformity or tenderness   Neurologic: reflexes normal and symmetric, no cranial nerve deficit noted    LABS/IMAGING:    CBC:  Lab Results   Component Value Date    WBC 5.7 10/04/2020    HGB 12.4 10/04/2020    HCT 38.4 10/04/2020    MCV 93.7 10/04/2020     10/04/2020    LYMPHOPCT 26.6 09/29/2020    RBC 4.10 10/04/2020    MCH 30.2 10/04/2020    MCHC 32.3 10/04/2020    RDW 12.6 10/04/2020    NEUTOPHILPCT 59.5 09/29/2020    MONOPCT 10.1 09/29/2020    BASOPCT 0.7 09/29/2020    NEUTROABS 4.47 09/29/2020    LYMPHSABS 2.00 09/29/2020    MONOSABS 0.76 09/29/2020    EOSABS 0.21 09/29/2020    BASOSABS 0.05 09/29/2020       Recent Labs     10/04/20  0836 10/01/20  1302 09/30/20  0522   WBC 5.7 7.3 9.5   HGB 12.4 12.8 13.3   HCT 38.4 39.7 40.6   MCV 93.7 94.3 92.3    246 239       BMP:   Recent Labs     10/03/20  0751 10/04/20  0836 10/05/20  0627    142 144   K 3.5 3.5 3.8    105 107   CO2 25 24 26   BUN 13 17 11   CREATININE 0.7 0.6 0.6       MG:   Lab Results   Component Value Date    MG 2.1 10/05/2020     Ca/Phos:   Lab Results   Component Value Date    CALCIUM 8.8 10/05/2020     Amylase: No results found for: AMYLASE  Lipase: No results found for: LIPASE  LIVER PROFILE: No results for input(s): AST, ALT, LIPASE, BILIDIR, BILITOT, ALKPHOS in the last 72 hours. Invalid input(s): AMYLASE,  ALB    PT/INR: No results for input(s): PROTIME, INR in the last 72 hours. APTT: No results for input(s): APTT in the last 72 hours.     Cardiac Enzymes:  No results found for: CKTOTAL, CKMB, CKMBINDEX, TROPONINI                    PROBLEM LIST:  Patient Active Problem List   Diagnosis    H/O blood clots    Sick sinus syndrome (HCC)    Sinus arrest    Heart murmur    Status post placement of cardiac pacemaker    Dyslipidemia    Controlled type 2 diabetes mellitus with complication, without long-term current use of insulin (HCC)    S/P placement of cardiac pacemaker    Elective replacement indicated for cardiac pacemaker battery at end of lifespan    Pneumothorax               ASSESSMENT:    1.)Tenstion pneumothorax s/p Pigtail cathter   2.)Possible COPD  3.)S/P Pacer   4- subcutaneous emphysema         PLAN:  CT shows Pneumothorax ,mild   clinically does not seems have any progression of pneumothorax ,pain free   Did well with Spirometry   I believe if CXR shows resolution of her pneumo ,we observe clinically ,if progress or developed symptoms then she will need VATS  For now she looks very comfortable  CXR this pm   CXR in am  Then if resolve discharge plan     Leticia Wilks MD,[facility]  Pulmonary&Critical Care Medicine   Director of [address] [place] [address] Director of [address] [place] [address]    Margoth Thomas    NOTE: This report was transcribed using voice recognition software. Every effort was made to ensure accuracy; however, inadvertent computerized transcription errors may be present.

## 2020-10-06 NOTE — PROGRESS NOTES
Phone call made to Xray department in regards to why chest xray has not been completed, and if it was completed, why it has not been processed at this time.    Ariel Stratton

## 2020-10-06 NOTE — PROGRESS NOTES
Perfect serve message sent to Dr. Chalino Campbell to see if patient is okay to discharge from his standpoint.    Askpolo Stratton

## 2020-10-08 ENCOUNTER — TELEPHONE (OUTPATIENT)
Dept: NON INVASIVE DIAGNOSTICS | Age: 70
End: 2020-10-08

## 2020-10-09 ENCOUNTER — HOSPITAL ENCOUNTER (OUTPATIENT)
Dept: GENERAL RADIOLOGY | Age: 70
Discharge: HOME | End: 2020-10-11
Payer: MEDICARE

## 2020-10-09 ENCOUNTER — HOSPITAL ENCOUNTER (OUTPATIENT)
Age: 70
Discharge: HOME | End: 2020-10-11
Payer: MEDICARE

## 2020-10-09 ENCOUNTER — NURSE ONLY (OUTPATIENT)
Dept: CARDIOLOGY CLINIC | Age: 70
End: 2020-10-09
Payer: MEDICARE

## 2020-10-09 PROCEDURE — 71046 X-RAY EXAM CHEST 2 VIEWS: CPT

## 2020-10-09 PROCEDURE — 93280 PM DEVICE PROGR EVAL DUAL: CPT | Performed by: INTERNAL MEDICINE

## 2020-11-18 ENCOUNTER — OFFICE VISIT (OUTPATIENT)
Dept: CARDIOLOGY CLINIC | Age: 70
End: 2020-11-18
Payer: MEDICARE

## 2020-11-18 VITALS
BODY MASS INDEX: 31.49 KG/M2 | HEIGHT: 65 IN | DIASTOLIC BLOOD PRESSURE: 84 MMHG | HEART RATE: 89 BPM | SYSTOLIC BLOOD PRESSURE: 122 MMHG | WEIGHT: 189 LBS

## 2020-11-18 PROCEDURE — 3046F HEMOGLOBIN A1C LEVEL >9.0%: CPT | Performed by: INTERNAL MEDICINE

## 2020-11-18 PROCEDURE — 2022F DILAT RTA XM EVC RTNOPTHY: CPT | Performed by: INTERNAL MEDICINE

## 2020-11-18 PROCEDURE — 4040F PNEUMOC VAC/ADMIN/RCVD: CPT | Performed by: INTERNAL MEDICINE

## 2020-11-18 PROCEDURE — G8399 PT W/DXA RESULTS DOCUMENT: HCPCS | Performed by: INTERNAL MEDICINE

## 2020-11-18 PROCEDURE — G8484 FLU IMMUNIZE NO ADMIN: HCPCS | Performed by: INTERNAL MEDICINE

## 2020-11-18 PROCEDURE — 1090F PRES/ABSN URINE INCON ASSESS: CPT | Performed by: INTERNAL MEDICINE

## 2020-11-18 PROCEDURE — 99213 OFFICE O/P EST LOW 20 MIN: CPT | Performed by: INTERNAL MEDICINE

## 2020-11-18 PROCEDURE — 3017F COLORECTAL CA SCREEN DOC REV: CPT | Performed by: INTERNAL MEDICINE

## 2020-11-18 PROCEDURE — 1123F ACP DISCUSS/DSCN MKR DOCD: CPT | Performed by: INTERNAL MEDICINE

## 2020-11-18 PROCEDURE — 93000 ELECTROCARDIOGRAM COMPLETE: CPT | Performed by: INTERNAL MEDICINE

## 2020-11-18 PROCEDURE — G8427 DOCREV CUR MEDS BY ELIG CLIN: HCPCS | Performed by: INTERNAL MEDICINE

## 2020-11-18 PROCEDURE — G8417 CALC BMI ABV UP PARAM F/U: HCPCS | Performed by: INTERNAL MEDICINE

## 2020-11-18 PROCEDURE — 1036F TOBACCO NON-USER: CPT | Performed by: INTERNAL MEDICINE

## 2020-11-18 RX ORDER — ACETAMINOPHEN 160 MG
2000 TABLET,DISINTEGRATING ORAL DAILY
COMMUNITY

## 2020-11-18 NOTE — PROGRESS NOTES
OFFICE VISIT     PRIMARY CARE PHYSICIAN:      Paris Huynh MD       ALLERGIES / SENSITIVITIES:        Allergies   Allergen Reactions    Cefuroxime Axetil     Codeine     Haldol [Haloperidol]     Rocephin [Ceftriaxone Sodium-Lidocaine]           REVIEWED MEDICATIONS:        Current Outpatient Medications:     Cholecalciferol (VITAMIN D3) 50 MCG (2000 UT) CAPS, Take 2,000 Units by mouth daily, Disp: , Rfl:     Ascorbic Acid (VITAMIN C PO), Take 1 tablet by mouth daily, Disp: , Rfl:     simvastatin (ZOCOR) 10 MG tablet, TAKE 1 TABLET NIGHTLY, Disp: 90 tablet, Rfl: 3    furosemide (LASIX) 20 MG tablet, TAKE 1 TABLET DAILY, Disp: 90 tablet, Rfl: 3    pioglitazone (ACTOS) 15 MG tablet, TAKE 1 TABLET BY MOUTH EVERY DAY, Disp: , Rfl: 1    loratadine (CLARITIN) 10 MG tablet, Take 10 mg by mouth daily. , Disp: , Rfl:     aspirin 81 MG tablet, Take 81 mg by mouth daily. , Disp: , Rfl:     LORazepam (ATIVAN) 1 MG tablet, Take 1 mg by mouth 3 times daily. , Disp: , Rfl:       S: REASON FOR VISIT:       Chief Complaint   Patient presents with    Follow-Up from Hospital     s/p [facility] 09/29/20. Patient  PM site looks good. Patient denies any cp sob or dizziness. History of Present Illness:       Office Visit for follow up of Pacemaker and post-hospital f/u visit,    78 yr Ana Maria Ramos with history of SSS, s/p Pacemaker, Dyslipidemia came for f/u visit   She had PGR with new Atrial lead on 7/95/48, complicated by Pneumothorx   Patient is compliant with all medications   Guillaume any exertional chest pain or short of breath   No palpitations, dizzy or syncope.    Active at home   No orthopnea   Try to watch diet          Past Medical History:   Diagnosis Date    Asthma     Bleeding ulcer     diagnosed at age 17    [facility] [address]     H/O blood clots may 1994    around pacemaker site, shoulder and arm    Sick [facility] syndrome ([facility] [address]     Sinus arrest february 1994    requiring insertion of permanent pacemaker            Past Surgical History:   Procedure Laterality Date    APPENDECTOMY      BREAST BIOPSY Right 2019    BREAST LUMPECTOMY      3 benign lumps removed from left breast    CARDIAC PACEMAKER PLACEMENT      dr Edilia Soto for sick sinus syndrome sinus arrest    CARDIAC PACEMAKER PLACEMENT      battery depletion of permanent pacemaker with replacement    CARDIAC PACEMAKER PLACEMENT      PGR Pacesetter    CYST REMOVAL      cysts removed from hand, hip and foot    DIAGNOSTIC CARDIAC CATH LAB PROCEDURE  1990    [facility] normal coronaries, normal cath per PAW    DIAGNOSTIC CARDIAC CATH LAB PROCEDURE      mild dilated cardiomyopathy    EYE SURGERY Bilateral 2019    laser Sx (relieve pressure)    FOOT SURGERY  2004    broken right foot with removal of hematoma by dr Arturo Shirley, [facility]  2006    left mastectomy for cancer    PACEMAKER CHANGE  32    PGR & NEW RA LEAD      ([employer])    DR. Farhad Simpson     TONSILLECTOMY          History reviewed. No pertinent family history.        Social History     Tobacco Use    Smoking status: Former Smoker     Packs/day: 0.50     Years: 20.00     Pack years: 10.00     Last attempt to quit: 2001     Years since quittin.8    Smokeless tobacco: Never Used    Tobacco comment: quit in    Substance Use Topics    Alcohol use: No     Comment: daily pop         Review of Systems:  Constitutional: negative for fever and chills, or significant weight loss  HEENT: negative for acute visual symptoms or auditory problems, no dysphagia  Respiratory: negative for cough, wheezing, or hemoptysis  Cardiovascular: negative for chest pain, palpitations, and dyspnea  Gastrointestinal: negative for abdominal pain, diarrhea, nausea and vomiting  Endocrine: Negative for polyuria and polydyspsia  Genitourinary:negative for dysuria and hematuria  Derm: negative for rash and skin lesion(s)  Neurological: negative for tingling, numbness, weakness, seizures and tremors  Endocrine: negative for polydipsia and polyuria  Musculoskeletal: negative for pain or tenderness  Psychiatric: negative for anxiety, depression, or suicidal ideations         O:  COMPLETE PHYSICAL EXAM:       /84 (Site: Right Upper Arm, Position: Sitting, Cuff Size: Large Adult)   Pulse 89   Ht 5' 5\" (1.651 m)   Wt 189 lb (85.7 kg)   BMI 31.45 kg/m²       General:   Patient alert, comfortable, no distress. Appears stated age. HEENT:    Pupils equal, no icterus, no nasal drainage, tongue moist.   Neck:              No masses, Thyroid not palpable. No elevated JVD, No carotid bruit. Chest:   Normal configuration, non tender. Pacer site Reyes Fitzpatricktock on RIGHT   Lungs:   Clear to auscultation bilaterally, few scattered rhonchi. Cardiovascular:  Regular rhythm, 1/6 systolic murmur, No S3, no palpable thrills,    Abdomen:  Soft, Non tender, Bowel sounds normal, no pulsatile abdominal aorta. Extremities:  No edema. Distal pulses palpable. No cyanosis, no clubbing. Skin:   Good turgor, warm and dry, no cyanosis. Musculoskeletal: No joint swelling or deformity. Neuro:   Cranial nerves grossly intact; No focal neurologic deficit. Psych:   Alert, good mood and effect.      REVIEW OF DIAGNOSTIC TESTS:        Electrocardiogram: NSR              A/P:   ASSESSMENT / PLAN:    Harper Tran was seen today for follow-up from hospital.    Diagnoses and all orders for this visit:      Status post placement of cardiac pacemaker, Feb. 1994 - Dr Catina Moreland at [facility]; s/p PGR 2000, s/p PGR 2010-[facility] (high Atrial lead impedence of atrial Encore lead with retained stylet) - New Atrial lead due to high impedence and PGR,  [place] 9/90/9497 (complicated by Pneumothorax, s/p Chest tube)  - 12 lead EKG     Sick sinus syndrome [facility]) - s/p Pacer   - 12 lead EKG     S/P mastectomy, left - July 2006     Controlled type 2 diabetes mellitus with complication, without long-term current use of insulin ([facility] [address]     Dyslipidemia, on Statin    Non morbid obesity: Diet, exercise and weight loss discussed. Preventive Cardiology: Low cholesterol diet, regular exercise as tolerate, and gradual weight loss discussed. Monitor BP and heart rates. Above recommendations discussed with her. All questions answered about cardiac diagnoses and cardiac medications. Continue current medications. Compliance with medications and f/u with all physicians discussed. Risk factor modification based on risk profile discussed. Call if any exertional chest pain, short of breath, dizzy or palpitations   Follow up in 6 months or earlier if needed.          [facility]  [address], [place], [facility]  [phone]

## 2021-01-12 ENCOUNTER — NURSE ONLY (OUTPATIENT)
Dept: CARDIOLOGY CLINIC | Age: 71
End: 2021-01-12
Payer: MEDICARE

## 2021-01-12 DIAGNOSIS — Z95.0 STATUS POST PLACEMENT OF CARDIAC PACEMAKER: ICD-10-CM

## 2021-01-12 DIAGNOSIS — I49.5 SICK SINUS SYNDROME (HCC): Primary | ICD-10-CM

## 2021-01-12 PROCEDURE — 93280 PM DEVICE PROGR EVAL DUAL: CPT | Performed by: INTERNAL MEDICINE

## 2021-04-05 ENCOUNTER — IMMUNIZATION (OUTPATIENT)
Dept: PRIMARY CARE CLINIC | Age: 71
End: 2021-04-05
Payer: MEDICARE

## 2021-04-05 PROCEDURE — 91301 COVID-19, MODERNA VACCINE 100MCG/0.5ML DOSE: CPT | Performed by: NURSE PRACTITIONER

## 2021-04-05 PROCEDURE — 0011A COVID-19, MODERNA VACCINE 100MCG/0.5ML DOSE: CPT | Performed by: NURSE PRACTITIONER

## 2021-05-03 ENCOUNTER — IMMUNIZATION (OUTPATIENT)
Dept: PRIMARY CARE CLINIC | Age: 71
End: 2021-05-03
Payer: MEDICARE

## 2021-05-03 PROCEDURE — 0012A COVID-19, MODERNA VACCINE 100MCG/0.5ML DOSE: CPT | Performed by: NURSE PRACTITIONER

## 2021-05-03 PROCEDURE — 91301 COVID-19, MODERNA VACCINE 100MCG/0.5ML DOSE: CPT | Performed by: NURSE PRACTITIONER

## 2021-06-01 RX ORDER — SIMVASTATIN 10 MG
TABLET ORAL
Qty: 90 TABLET | Refills: 3 | Status: SHIPPED
Start: 2021-06-01 | End: 2021-12-06 | Stop reason: SDUPTHER

## 2021-06-01 RX ORDER — FUROSEMIDE 20 MG/1
TABLET ORAL
Qty: 90 TABLET | Refills: 3 | Status: SHIPPED
Start: 2021-06-01 | End: 2021-12-06 | Stop reason: SDUPTHER

## 2021-09-10 ENCOUNTER — HOSPITAL ENCOUNTER (OUTPATIENT)
Dept: MAMMOGRAPHY | Age: 71
Discharge: HOME | End: 2021-09-12
Payer: MEDICARE

## 2021-09-10 VITALS — BODY MASS INDEX: 27.31 KG/M2 | HEIGHT: 64 IN | WEIGHT: 160 LBS

## 2021-09-10 DIAGNOSIS — Z12.31 ENCOUNTER FOR SCREENING MAMMOGRAM FOR MALIGNANT NEOPLASM OF BREAST: ICD-10-CM

## 2021-09-10 DIAGNOSIS — Z12.31 OTHER SCREENING MAMMOGRAM: ICD-10-CM

## 2021-09-10 PROCEDURE — 77063 BREAST TOMOSYNTHESIS BI: CPT

## 2021-11-09 ENCOUNTER — NURSE ONLY (OUTPATIENT)
Dept: CARDIOLOGY CLINIC | Age: 71
End: 2021-11-09

## 2021-11-09 ENCOUNTER — OFFICE VISIT (OUTPATIENT)
Dept: CARDIOLOGY CLINIC | Age: 71
End: 2021-11-09
Payer: MEDICARE

## 2021-11-09 VITALS
SYSTOLIC BLOOD PRESSURE: 118 MMHG | HEART RATE: 85 BPM | HEIGHT: 63 IN | DIASTOLIC BLOOD PRESSURE: 78 MMHG | WEIGHT: 162 LBS | BODY MASS INDEX: 28.7 KG/M2

## 2021-11-09 DIAGNOSIS — I49.5 SICK SINUS SYNDROME (HCC): Primary | ICD-10-CM

## 2021-11-09 DIAGNOSIS — E66.9 NON MORBID OBESITY: ICD-10-CM

## 2021-11-09 DIAGNOSIS — Z95.0 STATUS POST PLACEMENT OF CARDIAC PACEMAKER: ICD-10-CM

## 2021-11-09 DIAGNOSIS — E78.5 DYSLIPIDEMIA: ICD-10-CM

## 2021-11-09 PROCEDURE — 99214 OFFICE O/P EST MOD 30 MIN: CPT | Performed by: INTERNAL MEDICINE

## 2021-11-09 PROCEDURE — 1036F TOBACCO NON-USER: CPT | Performed by: INTERNAL MEDICINE

## 2021-11-09 PROCEDURE — G8399 PT W/DXA RESULTS DOCUMENT: HCPCS | Performed by: INTERNAL MEDICINE

## 2021-11-09 PROCEDURE — G8427 DOCREV CUR MEDS BY ELIG CLIN: HCPCS | Performed by: INTERNAL MEDICINE

## 2021-11-09 PROCEDURE — G8417 CALC BMI ABV UP PARAM F/U: HCPCS | Performed by: INTERNAL MEDICINE

## 2021-11-09 PROCEDURE — 3017F COLORECTAL CA SCREEN DOC REV: CPT | Performed by: INTERNAL MEDICINE

## 2021-11-09 PROCEDURE — G8484 FLU IMMUNIZE NO ADMIN: HCPCS | Performed by: INTERNAL MEDICINE

## 2021-11-09 PROCEDURE — 1090F PRES/ABSN URINE INCON ASSESS: CPT | Performed by: INTERNAL MEDICINE

## 2021-11-09 PROCEDURE — 93000 ELECTROCARDIOGRAM COMPLETE: CPT | Performed by: INTERNAL MEDICINE

## 2021-11-09 PROCEDURE — 1123F ACP DISCUSS/DSCN MKR DOCD: CPT | Performed by: INTERNAL MEDICINE

## 2021-11-09 PROCEDURE — 4040F PNEUMOC VAC/ADMIN/RCVD: CPT | Performed by: INTERNAL MEDICINE

## 2021-11-09 RX ORDER — M-VIT,TX,IRON,MINS/CALC/FOLIC 27MG-0.4MG
1 TABLET ORAL DAILY
COMMUNITY

## 2021-11-09 NOTE — PROGRESS NOTES
OFFICE VISIT     PRIMARY CARE PHYSICIAN:      Myrtle Laguerre MD       ALLERGIES / SENSITIVITIES:        Allergies   Allergen Reactions    Cefuroxime Axetil     Codeine     Haldol [Haloperidol]     Rocephin [Ceftriaxone Sodium-Lidocaine]           REVIEWED MEDICATIONS:        Current Outpatient Medications:     simvastatin (ZOCOR) 10 MG tablet, TAKE 1 TABLET NIGHTLY, Disp: 90 tablet, Rfl: 3    furosemide (LASIX) 20 MG tablet, TAKE 1 TABLET DAILY, Disp: 90 tablet, Rfl: 3    Cholecalciferol (VITAMIN D3) 50 MCG (2000 UT) CAPS, Take 2,000 Units by mouth daily, Disp: , Rfl:     Ascorbic Acid (VITAMIN C PO), Take 1 tablet by mouth daily, Disp: , Rfl:     pioglitazone (ACTOS) 15 MG tablet, TAKE 1 TABLET BY MOUTH EVERY DAY, Disp: , Rfl: 1    loratadine (CLARITIN) 10 MG tablet, Take 10 mg by mouth daily. , Disp: , Rfl:     aspirin 81 MG tablet, Take 81 mg by mouth daily. , Disp: , Rfl:     LORazepam (ATIVAN) 1 MG tablet, Take 1 mg by mouth 3 times daily. , Disp: , Rfl:       S: REASON FOR VISIT:       Chief Complaint   Patient presents with    1 Year Follow Up     Doing well. Does state she gets \"prickly\" feeling around her pacemaker. No hospitalizations          History of Present Illness:                  Office Visit for follow up of SS, Pacemaker,               78 yr Yosi Soto with history of SSS, s/p Pacemaker, PGR 2020, Dyslipidemia came for f/u visit   No hospitalizations or surgeries since last visit   Patient is compliant with all medications   Guillaume any exertional chest pain or short of breath   No palpitations, dizzy or syncope.    Active at home   No orthopnea   Try to watch diet          Past Medical History:   Diagnosis Date    Asthma     Bleeding ulcer     diagnosed at age 17    BRCA1 negative     Patient said she thinks she had the genetic testing after she had a left mastectomy and the results were negative per mammo hx sheet    Diabetes mellitus ([facility] [address]     H/O blood clots may 1994 and vomiting  Endocrine: Negative for polyuria and polydyspsia  Genitourinary:negative for dysuria and hematuria  Derm: negative for rash and skin lesion(s)  Neurological: negative for tingling, numbness, weakness, seizures and tremors  Endocrine: negative for polydipsia and polyuria  Musculoskeletal: negative for pain or tenderness  Psychiatric: negative for anxiety, depression, or suicidal ideations         O:  COMPLETE PHYSICAL EXAM:       Ht 5' 4\" (1.626 m)   BMI 27.46 kg/m²       General:   Patient alert, comfortable, no distress. Appears stated age. HEENT:    Pupils equal, no icterus, no nasal drainage, tongue moist.   Neck:              No masses, Thyroid not palpable. No elevated JVD, No carotid bruit. Chest:   Normal configuration, non tender. Lungs:   Clear to auscultation bilaterally, few scattered rhonchi. Cardiovascular:  Regular rhythm, 1/6 systolic murmur, No S3, PMI at 5th ICS, no palpable thrills,    Abdomen:  Soft, Non tender, Bowel sounds normal, no pulsatile abdominal aorta, no palpable masses. Extremities:  No edema. Distal pulses palpable. No cyanosis, no clubbing. Skin:   Good turgor, warm and dry, no cyanosis. Musculoskeletal: No joint swelling or deformity. Neuro:   Cranial nerves grossly intact; No focal neurologic deficit. Psych:   Alert, good mood and effect. REVIEW OF DIAGNOSTIC TESTS:        Electrocardiogram: Reviewed      Pacemaker:  Interrogated, OK           A/P:   ASSESSMENT / PLAN:    Radha Babb was seen today for 1 year follow up. Diagnoses and all orders for this visit:      Status post placement of cardiac pacemaker, Feb. 1994 ([facility] Raj Ferrell [facility]  s/p PGR 2000, s/p PGR 2010-[facility] (high Atrial lead impedence of atrial Encore lead with retained stylet) - New Atrial lead due to high impedence and PGR,  [place] on 0/97/1198 (complicated by Pneumothorax, s/p Chest tube)    - 12 lead EKG     Sick sinus syndrome (HCC) - s/p Pacemaker   - 12 lead EKG     Retained Atrial Encore J stylet - Needs annual Fluoroscopy due to manufacture recommendation due to lead fracture risk - She does in February of every year    S/P mastectomy, left - July 2006     Controlled type 2 diabetes mellitus without complication, without long-term current use of insulin ([facility] [address] - Per Dr Lydia Duran     Dyslipidemia, on Statin    Preventive Cardiology: Low cholesterol diet, regular exercise as tolerate, and gradual weight loss discussed. Monitor BP and heart rates. Above recommendations discussed with her. All questions answered about cardiac diagnoses and cardiac medications. Continue current medications. Compliance with medications and f/u with all physicians discussed. Risk factor modification based on risk profile discussed. Call if any exertional chest pain, short of breath, dizzy or palpitations   Follow up in 6 months or earlier if needed.          [id number] [facility]  [address], [place], [facility]  [phone]

## 2021-12-06 RX ORDER — FUROSEMIDE 20 MG/1
TABLET ORAL
Qty: 90 TABLET | Refills: 3 | Status: SHIPPED | OUTPATIENT
Start: 2021-12-06

## 2021-12-06 RX ORDER — SIMVASTATIN 10 MG
TABLET ORAL
Qty: 90 TABLET | Refills: 3 | Status: SHIPPED | OUTPATIENT
Start: 2021-12-06

## 2022-02-09 ENCOUNTER — TELEPHONE (OUTPATIENT)
Dept: NON INVASIVE DIAGNOSTICS | Age: 72
End: 2022-02-09

## 2022-03-15 ENCOUNTER — TELEPHONE (OUTPATIENT)
Dept: CARDIAC CATH/INVASIVE PROCEDURES | Age: 72
End: 2022-03-15

## 2022-03-15 DIAGNOSIS — T82.110A PACEMAKER LEAD MALFUNCTION, INITIAL ENCOUNTER: Primary | ICD-10-CM

## 2022-03-15 NOTE — TELEPHONE ENCOUNTER
Reminded patient of scheduled procedure on  3/16 . Instructions given and COVID questionnaire completed.

## 2022-03-16 ENCOUNTER — HOSPITAL ENCOUNTER (OUTPATIENT)
Dept: CARDIAC CATH/INVASIVE PROCEDURES | Age: 72
Discharge: HOME | End: 2022-03-16
Payer: MEDICARE

## 2022-03-16 PROCEDURE — 76000 FLUOROSCOPY <1 HR PHYS/QHP: CPT | Performed by: INTERNAL MEDICINE

## 2022-03-16 PROCEDURE — 76000 FLUOROSCOPY <1 HR PHYS/QHP: CPT

## 2022-03-16 PROCEDURE — 99214 OFFICE O/P EST MOD 30 MIN: CPT | Performed by: INTERNAL MEDICINE

## 2022-03-16 PROCEDURE — 93286 PERI-PX EVAL PM/LDLS PM IP: CPT | Performed by: INTERNAL MEDICINE

## 2022-03-16 NOTE — H&P
Cardiac Electrophysiology History and Physical Examination    Angle Hodges  1950  Date of Service: 3/16/2022  PCP: MD Lesly Pan MD  Electrophysiologist: Filipe Ferro MD        Subjective: Angle Hodges is seen in hospital for yearlyr outine fluoroscopy of her pacemaker leads. Ms. Kain Rubin has a past medical history as noted below. She has an abandoned atrial lead that requires fluoroscopy yearly to ensure that the retained stylette is stable. She offers no new complaints. Patient Active Problem List   Diagnosis    H/O blood clots    Sick sinus syndrome (HCC)    Sinus arrest    Heart murmur    Status post placement of cardiac pacemaker    Dyslipidemia    Controlled type 2 diabetes mellitus with complication, without long-term current use of insulin ([facility])    S/P placement of cardiac pacemaker    Elective replacement indicated for cardiac pacemaker battery at end of lifespan    Pneumothorax       Current Outpatient Medications   Medication Sig Dispense Refill    furosemide (LASIX) 20 MG tablet TAKE 1 TABLET DAILY 90 tablet 3    simvastatin (ZOCOR) 10 MG tablet TAKE 1 TABLET NIGHTLY 90 tablet 3    Multiple Vitamins-Minerals (THERAPEUTIC MULTIVITAMIN-MINERALS) tablet Take 1 tablet by mouth daily      Cholecalciferol (VITAMIN D3) 50 MCG (2000 UT) CAPS Take 2,000 Units by mouth daily      Ascorbic Acid (VITAMIN C PO) Take 1 tablet by mouth daily      loratadine (CLARITIN) 10 MG tablet Take 10 mg by mouth daily.  aspirin 81 MG tablet Take 81 mg by mouth daily.  LORazepam (ATIVAN) 1 MG tablet Take 1 mg by mouth 3 times daily. No current facility-administered medications for this encounter. Allergies   Allergen Reactions    Cefuroxime Axetil     Codeine     Haldol [Haloperidol]     Rocephin [Ceftriaxone Sodium-Lidocaine]      ROS:   Constitutional: Negative for fever, activity change and for appetite change.    HENT: Negative for epistaxis. Eyes: Negative for diploplia, blurred vision. Respiratory: Negative for cough, chest tightness, shortness of breath and wheezing. Cardiovascular: pertinent positives in HPI  Gastrointestinal: Negative for abdominal pain and blood in stool. All other review of systems are negative     PHYSICAL EXAM:  There were no vitals filed for this visit. Constitutional: Oriented to person, place, and time. Well-developed and cooperative. Head: Normocephalic and atraumatic. Eyes: Conjunctivae are normal.  Neck: No  JVD present. Cardiovascular: S1 normal, S2 normal  A regular rhythm present. Pulmonary/Chest: Effort normal and breath sounds normal. No respiratory distress. Abdominal: Soft. Normal appearance   Musculoskeletal: Normal range of motion of all extremities, no muscle weakness. Neurological: Alert and oriented to person, place, and time. Skin: Skin is warm and dry. No bruising, no ecchymosis and no rash noted. Extremity: No clubbing or cyanosis. No edema. Psychiatric: Normal mood and affect. Thought content normal.   CRT-P site: well healed. No erosion, infection and migration    Pertinent Cardiac Testing:    Echocardiogram 09/30/20:  Findings      Left Ventricle   Normal left ventricular chamber size. Normal left ventricular systolic function. Visually estimated LVEF is 55-60%. Stage I diastolic dysfunction. Normal left atrial pressure. Right Ventricle   Normal right ventricle size and function. Pacer lead in RV. Left Atrium   Left atrium is of normal size. Interatrial septum not well visualized but appears intact. Right Atrium   Normal right atrium. Mitral Valve   Normal mitral valve structure and function. No mitral valve prolapse. Tricuspid Valve   Normal tricuspid valve structure. There is trace tricuspid regurgitation, RVSP 24mmHg. Aortic Valve   The aortic valve appears mildly sclerotic.    No hemodynamically significant aortic stenosis is present. Pulmonic Valve   The pulmonic valve was not well visualized. Pericardial Effusion   No evidence of pericardial effusion. Pericardium appears normal.      Pleural Effusion   No evidence of pleural effusion. Aorta   Normal aortic root size and ascending aorta. Miscellaneous   The inferior vena cava diameter is normal with normal respiratory   variation. Conclusions      Summary   Normal left ventricular chamber size. Normal left ventricular systolic function, LVEF is 55-60%. Stage I diastolic dysfunction. Normal right ventricle size and function. Pacer lead in RV. The aortic valve appears mildly sclerotic. No hemodynamically significant aortic stenosis is present. There is trace tricuspid regurgitation, RVSP 24mmHg. Normal aortic root size. No evidence of pericardial effusion. Pericardium appears normal.   No intra cardiac mass or thrombus. No recent comparison study available.       Signature      ----------------------------------------------------------------   Electronically signed by Topher Hanna MD(Interpreting   physician) on 09/15/2020 04:59 PM   ----------------------------------------------------------------     ECG 09/29/2020: Normal sinus rhythm rate 78 bpmm QRS 94, QTc 453ms     Device Interrogation 09/30/20:  Make/Model [employer] Assurity MRI   Mode DDDR 70/130  P wave: >5.0 mV  Impedance: 760 ohms   Threshold: 0.5 V @ 0.5 ms  RV R wave: 8.1 mV  Impedance: 580 ohms   Threshold: 1.25 V @ 1.0 ms  Pacing: A: <1%  RV: <1%  Battery Voltage/Longevity:  4.6-11.4 years     Arrhythmias: none   Reprogramming included none   Overall device function is normal    All device programmable settings were evaluated per above and in the scanned document, along with iterative adjustments (capture thresholds) to assess and select the most appropriate final programming to provide for consistent delivery of the appropriate therapy and to verify function of the device. Impression:    1. Pacemaker in situ  - [address] at [facility]  - right sided   - DOI 2/1994   - PGR 2010   - Retained atrial stylet with recommend yearly fluoroscopy per  advisory   - Previously high atrial lead impedence   - Prior Atrial lead abandoned NOT MRI compatible  - New atrial lead placed, PGR 09/29/20  - Normal pacemaker function. 2. Sick sinus syndrome   - Status post pacemaker     3. Hyperlipidemia   -  On Zocor    4. History of left mastectomy     5. Diabetes mellitus   - On Actos. 6. Anxiety  - On ativan      Recommendations:     Ms. Olga Castaneda presents for fluoroscopy of her abandoned atrial pacemaking lead. Further recommendations pending. Thank you for allowing me to participate in your patient's care.     Dago Huddleston MD  Cardiac Electrophysiology  [facility]  The [facility] V[facility] [place]: [place] Electrophysiology  10:03 AM  3/16/2022

## 2022-03-16 NOTE — OP NOTE
[facility]) Physicians- The Heart and Vascular [address] [place] [address] Electrophysiology  Procedure Report  PATIENT: Maynor Head  MEDICAL RECORD NUMBER: [id number]  DATE OF PROCEDURE:  3/16/2022  ATTENDING ELECTROPHYSIOLOGIST:Yesika Barba MD  REFERRING PHYSICIAN: Dr. Lorena Breen    Procedure Performed:  1. Fluoroscopy  2. Periprocedure device interrogation    Indication for Procedure:  1. Abandoned atrial lead with retained stylet    Flouroscopy: 1.0 min  Complications: none immediately apparent  EBL: 0 cc  Specimens: none  Contrast: 0 cc    FINDINGS:  1. The Annual fluoroscopy of the patient's abandoned atrial pacemaker lead with stylet was performed. The abandoned atrial lead status remains stable with no evidence of protrusion of the stylet    Device Interrogation:   Underlying rhythm: AsVs  Mode: DDDR   Battery Voltage/Longevity:  5.0-9.6 years  Pacing: A: 76%  RV: 1%    P wave: 3.6 mV  Impedance: 530 ohms   Threshold: 0.75 V @ 0.5 ms  RV R wave: 8.1 mV  Impedance: 540 ohms   Threshold: 0.75 V @ 1.0 ms  Episodes: none  Reprogramming included: none  Overall device function is normal    All device programmable settings were evaluated per above and in the scanned document, along with iterative adjustments (capture thresholds) to assess and select the most appropriate final programming to provide for consistent delivery of the appropriate therapy and to verify function of the device. DETAILS OF THE OPERATION:  The patient was brought to the Electrophysiology lab. The fluoroscopy was performed in AP, GARSIA and Bulgarian views. At the end of the case, the patient was hemodynamically stable and was brought back to the recovery area. ASSESSMENT:  1. Successful fluoroscopy of abandoned atrial lead with retained stylet     RECOMMENDATIONS:  1. OK to discharge home. 2. Remote pacemaker interrogation every 3 months and follow up in office with provider once a year.   3. Follow up in 1 year for annual fluoroscopy of abandoned atrial lead with retained stylet.     Kenzie Jorgensen MD  Cardiac Electrophysiology  [facility]  The Heart and Vascular [place]: [place] [facility]rophys[facility]  10:08 AM  3/16/2022

## 2022-06-28 ENCOUNTER — NURSE ONLY (OUTPATIENT)
Dept: CARDIOLOGY CLINIC | Age: 72
End: 2022-06-28

## 2022-06-28 ENCOUNTER — OFFICE VISIT (OUTPATIENT)
Dept: CARDIOLOGY CLINIC | Age: 72
End: 2022-06-28
Payer: MEDICARE

## 2022-06-28 VITALS
BODY MASS INDEX: 28.17 KG/M2 | RESPIRATION RATE: 16 BRPM | DIASTOLIC BLOOD PRESSURE: 72 MMHG | WEIGHT: 165 LBS | HEIGHT: 64 IN | HEART RATE: 93 BPM | SYSTOLIC BLOOD PRESSURE: 112 MMHG

## 2022-06-28 DIAGNOSIS — E78.5 DYSLIPIDEMIA: ICD-10-CM

## 2022-06-28 DIAGNOSIS — R00.1 BRADYCARDIA: Primary | ICD-10-CM

## 2022-06-28 DIAGNOSIS — I49.5 SICK SINUS SYNDROME (HCC): ICD-10-CM

## 2022-06-28 DIAGNOSIS — Z95.0 STATUS POST PLACEMENT OF CARDIAC PACEMAKER: ICD-10-CM

## 2022-06-28 PROCEDURE — G8427 DOCREV CUR MEDS BY ELIG CLIN: HCPCS | Performed by: INTERNAL MEDICINE

## 2022-06-28 PROCEDURE — 1090F PRES/ABSN URINE INCON ASSESS: CPT | Performed by: INTERNAL MEDICINE

## 2022-06-28 PROCEDURE — G8417 CALC BMI ABV UP PARAM F/U: HCPCS | Performed by: INTERNAL MEDICINE

## 2022-06-28 PROCEDURE — 93000 ELECTROCARDIOGRAM COMPLETE: CPT | Performed by: INTERNAL MEDICINE

## 2022-06-28 PROCEDURE — 1036F TOBACCO NON-USER: CPT | Performed by: INTERNAL MEDICINE

## 2022-06-28 PROCEDURE — 99214 OFFICE O/P EST MOD 30 MIN: CPT | Performed by: INTERNAL MEDICINE

## 2022-06-28 PROCEDURE — G8399 PT W/DXA RESULTS DOCUMENT: HCPCS | Performed by: INTERNAL MEDICINE

## 2022-06-28 PROCEDURE — 3017F COLORECTAL CA SCREEN DOC REV: CPT | Performed by: INTERNAL MEDICINE

## 2022-06-28 PROCEDURE — 1123F ACP DISCUSS/DSCN MKR DOCD: CPT | Performed by: INTERNAL MEDICINE

## 2022-06-28 NOTE — PROGRESS NOTES
OFFICE VISIT     PRIMARY CARE PHYSICIAN:      Herlinda Padilla MD       ALLERGIES / SENSITIVITIES:        Allergies   Allergen Reactions    Cefuroxime Axetil     Codeine     Haldol [Haloperidol]     Rocephin [Ceftriaxone Sodium-Lidocaine]           REVIEWED MEDICATIONS:        Current Outpatient Medications:     furosemide (LASIX) 20 MG tablet, TAKE 1 TABLET DAILY, Disp: 90 tablet, Rfl: 3    simvastatin (ZOCOR) 10 MG tablet, TAKE 1 TABLET NIGHTLY, Disp: 90 tablet, Rfl: 3    Multiple Vitamins-Minerals (THERAPEUTIC MULTIVITAMIN-MINERALS) tablet, Take 1 tablet by mouth daily, Disp: , Rfl:     Cholecalciferol (VITAMIN D3) 50 MCG (2000 UT) CAPS, Take 2,000 Units by mouth daily, Disp: , Rfl:     loratadine (CLARITIN) 10 MG tablet, Take 10 mg by mouth daily. , Disp: , Rfl:     aspirin 81 MG tablet, Take 81 mg by mouth daily. , Disp: , Rfl:     LORazepam (ATIVAN) 1 MG tablet, Take 1 mg by mouth 3 times daily. , Disp: , Rfl:     Ascorbic Acid (VITAMIN C PO), Take 1 tablet by mouth daily (Patient not taking: Reported on 6/28/2022), Disp: , Rfl:       S: REASON FOR VISIT:       Chief Complaint   Patient presents with    Hyperlipidemia    6 Month Follow-Up     Bradycardia, pacemaker            History of Present Illness:       Office Visit for follow up of SSS, Pacemaker, Dyslipidemia              81 yr Savita Comer with history of SSS, s/p Pacemaker, PGR 9/2020, Dyslipidemia came for f/u visit   No hospitalizations or surgeries since last visit   Patient is compliant with all medications   Guillaume any exertional chest pain or short of breath   No palpitations, dizzy or syncope.    Active at home   Try to watch diet          Past Medical History:   Diagnosis Date    Asthma     Bleeding ulcer     diagnosed at age 17    BRCA1 negative     Patient said she thinks she had the genetic testing after she had a left mastectomy and the results were negative per mammo hx sheet    Diabetes mellitus ([address]     H/O blood clots may 1994    around pacemaker site, shoulder and arm    Sick sinus syndrome ([address]     Sinus arrest 1994    requiring insertion of permanent pacemaker            Past Surgical History:   Procedure Laterality Date    APPENDECTOMY      BREAST BIOPSY Right 2019    BREAST LUMPECTOMY      3 benign lumps removed from left breast    [facility]    dr Karolina Webster for sick sinus syndrome sinus arrest    CARDIAC PACEMAKER PLACEMENT      battery depletion of permanent pacemaker with replacement    CARDIAC PACEMAKER PLACEMENT      PGR Pacesetter    CYST REMOVAL      cysts removed from hand, hip and foot    DIAGNOSTIC CARDIAC CATH LAB PROCEDURE  1990    [facility] normal coronaries, normal cath per PAW    DIAGNOSTIC CARDIAC CATH LAB PROCEDURE      mild dilated cardiomyopathy    EYE SURGERY Bilateral 2019    laser Sx (relieve pressure)    FOOT SURGERY  2004    broken right foot with removal of hematoma by dr Lisa Cantor ([address] [place] [address])      MASTECTOMY, RADICAL  2006    left mastectomy for cancer    OTHER SURGICAL HISTORY  2022    Fluoroscopy of abandoned atrial lead with retained stylet ([facility] [employer] )    PACEMAKER CHANGE  20    PGR & NEW RA LEAD      ([facility])    DR. Belén Lehman     TONSILLECTOMY            Family History   Problem Relation Age of Onset    Breast Cancer Sister         Per mammo hx sheet          Social History     Tobacco Use    Smoking status: Former     Packs/day: 0.50     Years: 20.00     Pack years: 10.00     Types: Cigarettes     Quit date: 2001     Years since quittin.5    Smokeless tobacco: Never    Tobacco comments:     quit in    Substance Use Topics    Alcohol use: No     Comment: daily pop         Review of Systems:    Constitutional: negative for fever and chills, or significant weight loss  HEENT: negative for acute visual symptoms or auditory problems, no dysphagia  Respiratory: negative for cough, wheezing, or hemoptysis  Cardiovascular: negative for chest pain, palpitations, and dyspnea  Gastrointestinal: negative for abdominal pain, diarrhea, melena, nausea and vomiting  Endocrine: Negative for polyuria and polydyspsia  Genitourinary: negative for dysuria and hematuria  Derm: negative for rash and skin lesion(s)  Neurological: negative for tingling, numbness, weakness, seizures  Endocrine: negative for polydipsia and polyuria  Musculoskeletal: negative for pain or tenderness  Psychiatric: negative for anxiety, depression, or suicidal ideations         O:  COMPLETE PHYSICAL EXAM:       /72   Pulse 93   Resp 16   Ht 5' 4\" (1.626 m)   Wt 165 lb (74.8 kg)   BMI 28.32 kg/m²       General:   Patient alert, comfortable, no distress. Appears stated age. HEENT:    Pupils equal, no icterus; Tongue moist.   Neck:              No masses, Thyroid not palpable. No elevated JVD, No carotid bruit. Chest:   Normal configuration, non tender. Pacer site OK   Lungs:   Clear to auscultation bilaterally except few scattered rhonchi. Cardiovascular:  Regular rhythm, 1/6 systolic murmur, No S3, no palpable thrills. Abdomen:  Soft, Bowel sounds normal, no pulsatile abdominal aorta, no palpable masses. Extremities:  No edema. Distal pulses palpable. No cyanosis   Skin:   Good turgor, warm and dry, no cyanosis. Musculoskeletal: No joint swelling or deformity. Neuro:   Cranial nerves grossly intact; No focal neurologic deficit. Psych:   Alert, good mood and effect. REVIEW OF DIAGNOSTIC TESTS:        Electrocardiogram: Reviewed                            Pacemaker:    Interrogated, OK           ASSESSMENT / PLAN:    Diagnoses and all orders for this visit:       Status post placement of cardiac pacemaker, Feb. 1994 (Dr Geo Santacruz at [facility]).  s/p PGR 2000, s/p PGR 2010-[employer] (high Atrial lead impedence of atrial Encore lead with retained stylet) - New Atrial lead due to high impedence and PGR-[place] on 0/53/2292 (complicated by Pneumothorax, s/p

## 2022-09-20 ENCOUNTER — HOSPITAL ENCOUNTER (OUTPATIENT)
Dept: MAMMOGRAPHY | Age: 72
Discharge: HOME | End: 2022-09-22
Payer: MEDICARE

## 2022-09-20 VITALS — WEIGHT: 160 LBS | BODY MASS INDEX: 27.31 KG/M2 | HEIGHT: 64 IN

## 2022-09-20 DIAGNOSIS — Z12.31 ENCOUNTER FOR SCREENING MAMMOGRAM FOR BREAST CANCER: ICD-10-CM

## 2022-09-20 DIAGNOSIS — Z12.31 OTHER SCREENING MAMMOGRAM: ICD-10-CM

## 2022-09-20 PROCEDURE — 77063 BREAST TOMOSYNTHESIS BI: CPT

## 2022-12-01 RX ORDER — SIMVASTATIN 10 MG
TABLET ORAL
Qty: 90 TABLET | Refills: 3 | Status: SHIPPED | OUTPATIENT
Start: 2022-12-01

## 2022-12-01 RX ORDER — FUROSEMIDE 20 MG/1
TABLET ORAL
Qty: 90 TABLET | Refills: 3 | Status: SHIPPED | OUTPATIENT
Start: 2022-12-01

## 2023-09-20 ENCOUNTER — HOSPITAL ENCOUNTER (OUTPATIENT)
Dept: MAMMOGRAPHY | Age: 73
Discharge: HOME | End: 2023-09-22
Attending: OBSTETRICS & GYNECOLOGY
Payer: MEDICARE

## 2023-09-20 VITALS — BODY MASS INDEX: 28.17 KG/M2 | WEIGHT: 165 LBS | HEIGHT: 64 IN

## 2023-09-20 DIAGNOSIS — Z12.31 ENCOUNTER FOR SCREENING MAMMOGRAM FOR MALIGNANT NEOPLASM OF BREAST: ICD-10-CM

## 2023-09-20 PROCEDURE — 77063 BREAST TOMOSYNTHESIS BI: CPT

## 2023-09-28 ENCOUNTER — TELEPHONE (OUTPATIENT)
Dept: ADMINISTRATIVE | Age: 73
End: 2023-09-28

## 2023-09-28 NOTE — TELEPHONE ENCOUNTER
Patient scheduled for Over Due Yearly appointment with Dr. Luciana Flanagan on 1/2/2024. Stated she also needed a device check. Attempted to reach device check line for warm transfer, unable to reach due to patient care.  Please advise scheduling

## 2024-01-02 ENCOUNTER — OFFICE VISIT (OUTPATIENT)
Dept: CARDIOLOGY CLINIC | Age: 74
End: 2024-01-02

## 2024-01-02 ENCOUNTER — NURSE ONLY (OUTPATIENT)
Dept: CARDIOLOGY CLINIC | Age: 74
End: 2024-01-02
Payer: MEDICARE

## 2024-01-02 VITALS
SYSTOLIC BLOOD PRESSURE: 106 MMHG | HEIGHT: 64 IN | HEART RATE: 81 BPM | WEIGHT: 164 LBS | RESPIRATION RATE: 16 BRPM | BODY MASS INDEX: 28 KG/M2 | DIASTOLIC BLOOD PRESSURE: 74 MMHG

## 2024-01-02 DIAGNOSIS — R00.1 BRADYCARDIA: Primary | ICD-10-CM

## 2024-01-02 DIAGNOSIS — Z95.0 STATUS POST PLACEMENT OF CARDIAC PACEMAKER: ICD-10-CM

## 2024-01-02 DIAGNOSIS — I49.5 SICK SINUS SYNDROME (HCC): ICD-10-CM

## 2024-01-02 DIAGNOSIS — E78.5 DYSLIPIDEMIA: ICD-10-CM

## 2024-01-02 DIAGNOSIS — Z01.818 PREOP TESTING: Primary | ICD-10-CM

## 2024-01-02 PROCEDURE — 93280 PM DEVICE PROGR EVAL DUAL: CPT | Performed by: INTERNAL MEDICINE

## 2024-01-02 RX ORDER — SERTRALINE HYDROCHLORIDE 25 MG/1
25 TABLET, FILM COATED ORAL
COMMUNITY

## 2024-01-02 RX ORDER — MELOXICAM 15 MG/1
TABLET ORAL
COMMUNITY

## 2024-01-02 NOTE — PROGRESS NOTES
OFFICE VISIT     PRIMARY CARE PHYSICIAN:      Tristin Brown MD       ALLERGIES / SENSITIVITIES:        Allergies   Allergen Reactions    Cefuroxime Axetil     Codeine     Haldol [Haloperidol]     Rocephin [Ceftriaxone Sodium-Lidocaine]           REVIEWED MEDICATIONS:        Current Outpatient Medications:     meloxicam (MOBIC) 15 MG tablet, TAKE 1 TABLET EVERY DAY BY ORAL ROUTE AS NEEDED., Disp: , Rfl:     sertraline (ZOLOFT) 25 MG tablet, Take 1 tablet by mouth nightly, Disp: , Rfl:     furosemide (LASIX) 20 MG tablet, TAKE 1 TABLET EVERY DAY, Disp: 90 tablet, Rfl: 0    simvastatin (ZOCOR) 10 MG tablet, TAKE 1 TABLET NIGHTLY, Disp: 90 tablet, Rfl: 0    Multiple Vitamins-Minerals (THERAPEUTIC MULTIVITAMIN-MINERALS) tablet, Take 1 tablet by mouth daily, Disp: , Rfl:     Cholecalciferol (VITAMIN D3) 50 MCG (2000 UT) CAPS, Take 1 capsule by mouth daily, Disp: , Rfl:     loratadine (CLARITIN) 10 MG tablet, Take 1 tablet by mouth daily, Disp: , Rfl:     aspirin 81 MG tablet, Take 1 tablet by mouth daily, Disp: , Rfl:     LORazepam (ATIVAN) 1 MG tablet, Take 1 tablet by mouth 3 times daily., Disp: , Rfl:     Ascorbic Acid (VITAMIN C PO), Take 1 tablet by mouth daily (Patient not taking: Reported on 6/28/2022), Disp: , Rfl:       S: REASON FOR VISIT:       Chief Complaint   Patient presents with    Bradycardia     Overdue yearly office visit          History of Present Illness:       Office Visit for follow up of SSS, Pacemaker, Dyslipidemia              73 yr Rabia Swann with history of SSS, s/p Pacemaker, PGR 9/2020, Dyslipidemia came for f/u visit    No hospitalizations or surgeries since last visit   Patient is compliant with all medications   Guillaume any exertional chest pain or short of breath   No palpitations, dizzy or syncope.   Active at home   Try to watch diet          Past Medical History:   Diagnosis Date    Asthma     Bleeding ulcer     diagnosed at age 16    BRCA1 negative     Patient said she thinks she

## 2024-03-06 RX ORDER — FUROSEMIDE 20 MG/1
TABLET ORAL
Qty: 90 TABLET | Refills: 3 | Status: SHIPPED | OUTPATIENT
Start: 2024-03-06

## 2024-03-06 RX ORDER — SIMVASTATIN 10 MG
TABLET ORAL
Qty: 90 TABLET | Refills: 3 | Status: SHIPPED | OUTPATIENT
Start: 2024-03-06

## 2024-09-23 ENCOUNTER — HOSPITAL ENCOUNTER (OUTPATIENT)
Dept: MAMMOGRAPHY | Age: 74
Discharge: HOME | End: 2024-09-25
Attending: OBSTETRICS & GYNECOLOGY
Payer: MEDICARE

## 2024-09-23 DIAGNOSIS — Z12.31 ENCOUNTER FOR SCREENING MAMMOGRAM FOR MALIGNANT NEOPLASM OF BREAST: ICD-10-CM

## 2024-09-23 PROCEDURE — 77063 BREAST TOMOSYNTHESIS BI: CPT

## 2024-12-03 NOTE — PROGRESS NOTES
Attempted to reminded patient of scheduled procedure on 12/4.  No Instructions given no machine no answer , will keep on trying to reach pt.

## 2024-12-04 ENCOUNTER — TELEPHONE (OUTPATIENT)
Dept: ADMINISTRATIVE | Age: 74
End: 2024-12-04

## 2024-12-04 ENCOUNTER — HOSPITAL ENCOUNTER (OUTPATIENT)
Age: 74
Setting detail: OUTPATIENT SURGERY
Discharge: HOME | End: 2024-12-04
Attending: INTERNAL MEDICINE | Admitting: INTERNAL MEDICINE
Payer: MEDICARE

## 2024-12-04 VITALS
DIASTOLIC BLOOD PRESSURE: 79 MMHG | HEIGHT: 64 IN | BODY MASS INDEX: 25.61 KG/M2 | WEIGHT: 150 LBS | RESPIRATION RATE: 16 BRPM | OXYGEN SATURATION: 97 % | TEMPERATURE: 97.3 F | SYSTOLIC BLOOD PRESSURE: 143 MMHG | HEART RATE: 75 BPM

## 2024-12-04 DIAGNOSIS — Z95.0 CARDIAC PACEMAKER IN SITU: ICD-10-CM

## 2024-12-04 LAB — ECHO BSA: 1.75 M2

## 2024-12-04 PROCEDURE — 2709999900 HC NON-CHARGEABLE SUPPLY: Performed by: INTERNAL MEDICINE

## 2024-12-04 PROCEDURE — 7100000010 HC PHASE II RECOVERY - FIRST 15 MIN: Performed by: INTERNAL MEDICINE

## 2024-12-04 PROCEDURE — 76000 FLUOROSCOPY <1 HR PHYS/QHP: CPT | Performed by: INTERNAL MEDICINE

## 2024-12-04 NOTE — DISCHARGE INSTRUCTIONS
Discharge Date:  12/4/2024   Discharged To: home with support    Resume Activity:  Bathing:   Tub Yes   Shower Yes  Walking:Yes  Stairs: Yes  Driving: Yes  Work: Yes    Special Instructions: Call for any questions    Diet: No added salt.    Tobacco Cessation Counseling: Done.    Office Follow Up: Call for appointment with dr Maciel as scheduled

## 2024-12-04 NOTE — H&P
syndrome (HCC)     Sinus arrest 1994    requiring insertion of permanent pacemaker       Family History   Problem Relation Age of Onset    Breast Cancer Sister         Per mammo hx sheet       Social History     Tobacco Use    Smoking status: Former     Current packs/day: 0.00     Average packs/day: 0.5 packs/day for 20.0 years (10.0 ttl pk-yrs)     Types: Cigarettes     Start date: 1981     Quit date: 2001     Years since quittin.9    Smokeless tobacco: Never    Tobacco comments:     quit in    Substance Use Topics    Alcohol use: No     Comment: daily pop       No current facility-administered medications for this encounter.        Allergies   Allergen Reactions    Cefuroxime Axetil     Codeine     Haldol [Haloperidol]     Rocephin [Ceftriaxone Sodium-Lidocaine]        ROS:   Constitutional: Negative for fever, activity change and appetite change.   HENT: Negative for epistaxis.   Eyes: Negative for diploplia, blurred vision.   Respiratory: Negative for cough, chest tightness, shortness of breath and wheezing.   Cardiovascular: pertinent positives in HPI  Gastrointestinal: Negative for abdominal pain and blood in stool.   All other review of systems are negative     PHYSICAL EXAM:   Vitals:    24 0701   BP: (!) 143/79   Pulse: 75   Resp: 16   Temp: 97.3 °F (36.3 °C)   TempSrc: Temporal   SpO2: 97%   Weight: 68 kg (150 lb)   Height: 1.626 m (5' 4\")      Constitutional: Well-developed, no acute distress  Eyes: conjunctivae normal, no xanthelasma   Ears, Nose, Throat: oral mucosa moist, no cyanosis   CV: no JVD. Regular rate and rhythm. Normal S1S2 and no S3. No murmurs, rubs, or gallops.   Lungs: clear to auscultation bilaterally, normal respiratory effort without used of accessory muscles  Abdomen: soft, non-tender, nondistended  Musculoskeletal: no digital clubbing, no edema   Skin: warm, no rashes     I have personally reviewed the laboratory, cardiac diagnostic and radiographic testing

## 2025-02-06 ENCOUNTER — TELEPHONE (OUTPATIENT)
Dept: CARDIOLOGY CLINIC | Age: 75
End: 2025-02-06

## 2025-02-11 ENCOUNTER — NURSE ONLY (OUTPATIENT)
Dept: CARDIOLOGY CLINIC | Age: 75
End: 2025-02-11

## 2025-02-11 ENCOUNTER — OFFICE VISIT (OUTPATIENT)
Dept: CARDIOLOGY CLINIC | Age: 75
End: 2025-02-11
Payer: MEDICARE

## 2025-02-11 VITALS
HEIGHT: 64 IN | SYSTOLIC BLOOD PRESSURE: 102 MMHG | WEIGHT: 156.8 LBS | DIASTOLIC BLOOD PRESSURE: 64 MMHG | BODY MASS INDEX: 26.77 KG/M2 | RESPIRATION RATE: 16 BRPM | HEART RATE: 64 BPM

## 2025-02-11 DIAGNOSIS — E78.5 DYSLIPIDEMIA: ICD-10-CM

## 2025-02-11 DIAGNOSIS — I49.5 SICK SINUS SYNDROME (HCC): Primary | ICD-10-CM

## 2025-02-11 DIAGNOSIS — Z95.0 CARDIAC PACEMAKER IN SITU: ICD-10-CM

## 2025-02-11 PROCEDURE — 3017F COLORECTAL CA SCREEN DOC REV: CPT | Performed by: INTERNAL MEDICINE

## 2025-02-11 PROCEDURE — 1036F TOBACCO NON-USER: CPT | Performed by: INTERNAL MEDICINE

## 2025-02-11 PROCEDURE — 93000 ELECTROCARDIOGRAM COMPLETE: CPT | Performed by: INTERNAL MEDICINE

## 2025-02-11 PROCEDURE — G8399 PT W/DXA RESULTS DOCUMENT: HCPCS | Performed by: INTERNAL MEDICINE

## 2025-02-11 PROCEDURE — G8419 CALC BMI OUT NRM PARAM NOF/U: HCPCS | Performed by: INTERNAL MEDICINE

## 2025-02-11 PROCEDURE — 99214 OFFICE O/P EST MOD 30 MIN: CPT | Performed by: INTERNAL MEDICINE

## 2025-02-11 PROCEDURE — 1123F ACP DISCUSS/DSCN MKR DOCD: CPT | Performed by: INTERNAL MEDICINE

## 2025-02-11 PROCEDURE — G8427 DOCREV CUR MEDS BY ELIG CLIN: HCPCS | Performed by: INTERNAL MEDICINE

## 2025-02-11 PROCEDURE — 1159F MED LIST DOCD IN RCRD: CPT | Performed by: INTERNAL MEDICINE

## 2025-02-11 PROCEDURE — 1160F RVW MEDS BY RX/DR IN RCRD: CPT | Performed by: INTERNAL MEDICINE

## 2025-02-11 PROCEDURE — 1090F PRES/ABSN URINE INCON ASSESS: CPT | Performed by: INTERNAL MEDICINE

## 2025-02-11 RX ORDER — FUROSEMIDE 20 MG/1
TABLET ORAL
Qty: 90 TABLET | Refills: 2 | Status: SHIPPED
Start: 2025-02-11 | End: 2025-02-12 | Stop reason: SDUPTHER

## 2025-02-11 NOTE — PROGRESS NOTES
OFFICE VISIT     PRIMARY CARE PHYSICIAN:      Tristin Brown MD       ALLERGIES / SENSITIVITIES:        Allergies   Allergen Reactions    Cefuroxime Axetil     Codeine     Haldol [Haloperidol]     Rocephin [Ceftriaxone Sodium-Lidocaine]           REVIEWED MEDICATIONS:        Current Outpatient Medications:     furosemide (LASIX) 20 MG tablet, TAKE 1 TABLET EVERY DAY, Disp: 90 tablet, Rfl: 2    simvastatin (ZOCOR) 10 MG tablet, TAKE 1 TABLET EVERY NIGHT, Disp: 90 tablet, Rfl: 3    meloxicam (MOBIC) 15 MG tablet, TAKE 1 TABLET EVERY DAY BY ORAL ROUTE AS NEEDED., Disp: , Rfl:     Multiple Vitamins-Minerals (THERAPEUTIC MULTIVITAMIN-MINERALS) tablet, Take 1 tablet by mouth daily, Disp: , Rfl:     Cholecalciferol (VITAMIN D3) 50 MCG (2000 UT) CAPS, Take 1 capsule by mouth daily, Disp: , Rfl:     loratadine (CLARITIN) 10 MG tablet, Take 1 tablet by mouth daily, Disp: , Rfl:     aspirin 81 MG tablet, Take 1 tablet by mouth daily, Disp: , Rfl:     LORazepam (ATIVAN) 1 MG tablet, Take 1 tablet by mouth 4 times daily., Disp: , Rfl:       S: REASON FOR VISIT:       Chief Complaint   Patient presents with    Follow-Up from Hospital          History of Present Illness:       Office Visit for follow up of sick sinus syndrome, s/p Pacemaker, Dyslipidemia              74 yr Rabia Swann with history of SSS - s/p Pacemaker, PGR 9/2020, Dyslipidemia came for f/u visit    Had annual fluoroscopy of her atrial lead per manufacture recommendations   No hospitalizations or surgeries since last visit   Compliant with all medications   Guillaume any exertional chest pain or short of breath   No palpitations, dizzy or syncope.   Active at home   Try to watch diet          Past Medical History:   Diagnosis Date    Asthma     Bleeding ulcer     diagnosed at age 16    BRCA1 negative     Patient said she thinks she had the genetic testing after she had a left mastectomy and the results were negative per mammo hx sheet    Breast cancer (HCC)

## 2025-02-12 RX ORDER — SIMVASTATIN 10 MG
TABLET ORAL
Qty: 90 TABLET | Refills: 3 | Status: SHIPPED | OUTPATIENT
Start: 2025-02-12

## 2025-02-12 RX ORDER — FUROSEMIDE 20 MG/1
TABLET ORAL
Qty: 90 TABLET | Refills: 2 | Status: SHIPPED | OUTPATIENT
Start: 2025-02-12

## 2025-07-22 ENCOUNTER — TELEPHONE (OUTPATIENT)
Dept: CARDIOLOGY CLINIC | Age: 75
End: 2025-07-22

## 2025-07-24 ENCOUNTER — OFFICE VISIT (OUTPATIENT)
Dept: CARDIOLOGY CLINIC | Age: 75
End: 2025-07-24
Payer: MEDICARE

## 2025-07-24 ENCOUNTER — TELEPHONE (OUTPATIENT)
Dept: CARDIOLOGY | Age: 75
End: 2025-07-24

## 2025-07-24 ENCOUNTER — CLINICAL SUPPORT (OUTPATIENT)
Dept: CARDIOLOGY CLINIC | Age: 75
End: 2025-07-24

## 2025-07-24 VITALS
BODY MASS INDEX: 27.31 KG/M2 | WEIGHT: 160 LBS | RESPIRATION RATE: 16 BRPM | SYSTOLIC BLOOD PRESSURE: 106 MMHG | HEART RATE: 75 BPM | DIASTOLIC BLOOD PRESSURE: 72 MMHG | HEIGHT: 64 IN

## 2025-07-24 DIAGNOSIS — Z95.0 CARDIAC PACEMAKER IN SITU: ICD-10-CM

## 2025-07-24 DIAGNOSIS — E78.5 DYSLIPIDEMIA: ICD-10-CM

## 2025-07-24 DIAGNOSIS — I49.5 SICK SINUS SYNDROME (HCC): Primary | ICD-10-CM

## 2025-07-24 DIAGNOSIS — R06.02 SHORTNESS OF BREATH: ICD-10-CM

## 2025-07-24 PROCEDURE — 1036F TOBACCO NON-USER: CPT | Performed by: INTERNAL MEDICINE

## 2025-07-24 PROCEDURE — 1159F MED LIST DOCD IN RCRD: CPT | Performed by: INTERNAL MEDICINE

## 2025-07-24 PROCEDURE — 3017F COLORECTAL CA SCREEN DOC REV: CPT | Performed by: INTERNAL MEDICINE

## 2025-07-24 PROCEDURE — 93000 ELECTROCARDIOGRAM COMPLETE: CPT | Performed by: INTERNAL MEDICINE

## 2025-07-24 PROCEDURE — 1123F ACP DISCUSS/DSCN MKR DOCD: CPT | Performed by: INTERNAL MEDICINE

## 2025-07-24 PROCEDURE — G8419 CALC BMI OUT NRM PARAM NOF/U: HCPCS | Performed by: INTERNAL MEDICINE

## 2025-07-24 PROCEDURE — G8399 PT W/DXA RESULTS DOCUMENT: HCPCS | Performed by: INTERNAL MEDICINE

## 2025-07-24 PROCEDURE — 99214 OFFICE O/P EST MOD 30 MIN: CPT | Performed by: INTERNAL MEDICINE

## 2025-07-24 PROCEDURE — G8427 DOCREV CUR MEDS BY ELIG CLIN: HCPCS | Performed by: INTERNAL MEDICINE

## 2025-07-24 PROCEDURE — G2211 COMPLEX E/M VISIT ADD ON: HCPCS | Performed by: INTERNAL MEDICINE

## 2025-07-24 PROCEDURE — 1160F RVW MEDS BY RX/DR IN RCRD: CPT | Performed by: INTERNAL MEDICINE

## 2025-07-24 PROCEDURE — 1090F PRES/ABSN URINE INCON ASSESS: CPT | Performed by: INTERNAL MEDICINE

## 2025-07-24 NOTE — PROGRESS NOTES
OFFICE VISIT     PRIMARY CARE PHYSICIAN:      Tristin Brown MD         REVIEWED MEDICATIONS:        Current Outpatient Medications:     Coenzyme Q10 (CO Q 10 PO), Take by mouth, Disp: , Rfl:     furosemide (LASIX) 20 MG tablet, TAKE 1 TABLET EVERY DAY, Disp: 90 tablet, Rfl: 2    simvastatin (ZOCOR) 10 MG tablet, TAKE 1 TABLET EVERY NIGHT, Disp: 90 tablet, Rfl: 3    meloxicam (MOBIC) 15 MG tablet, TAKE 1 TABLET EVERY DAY BY ORAL ROUTE AS NEEDED., Disp: , Rfl:     Multiple Vitamins-Minerals (THERAPEUTIC MULTIVITAMIN-MINERALS) tablet, Take 1 tablet by mouth daily, Disp: , Rfl:     Cholecalciferol (VITAMIN D3) 50 MCG (2000 UT) CAPS, Take 1 capsule by mouth daily, Disp: , Rfl:     loratadine (CLARITIN) 10 MG tablet, Take 1 tablet by mouth daily, Disp: , Rfl:     aspirin 81 MG tablet, Take 1 tablet by mouth daily, Disp: , Rfl:     LORazepam (ATIVAN) 1 MG tablet, Take 1 tablet by mouth 4 times daily., Disp: , Rfl:       S: REASON FOR VISIT:       Chief Complaint   Patient presents with    Heart Problem     6 month           History of Present Illness:      History of Present Illness      Office Visit for follow up of sick sinus syndrome, s/p Pacemaker, Dyslipidemia              75 yr Rabia Swann with history of SSS - s/p Pacemaker, PGR 9/2020, Dyslipidemia came for f/u visit   No hospitalizations or surgeries since last visit   Compliant with all medications   C/o SOBOE for few months  No orthopnea  No chest pain    No palpitations, dizzy or syncope.   Active at home   Try to watch diet          Past Medical History:   Diagnosis Date    Asthma     Bleeding ulcer     diagnosed at age 16    BRCA1 negative     Patient said she thinks she had the genetic testing after she had a left mastectomy and the results were negative per mammo hx sheet    Breast cancer (HCC)     Left breast mastecomy for breast cancer per mammo hx sheet    Diabetes mellitus (HCC)     H/O blood clots 05/1994    around pacemaker site, shoulder and arm

## 2025-09-03 ENCOUNTER — HOSPITAL ENCOUNTER (OUTPATIENT)
Dept: CARDIOLOGY | Age: 75
Discharge: HOME | End: 2025-09-05
Payer: MEDICARE

## 2025-09-03 VITALS — WEIGHT: 160 LBS | HEIGHT: 64 IN | BODY MASS INDEX: 27.31 KG/M2

## 2025-09-03 DIAGNOSIS — R06.02 SHORTNESS OF BREATH: ICD-10-CM

## 2025-09-03 PROCEDURE — 93306 TTE W/DOPPLER COMPLETE: CPT

## 2025-09-07 LAB
ECHO AO ASC DIAM: 3.2 CM
ECHO AO ASCENDING AORTA INDEX: 1.8 CM/M2
ECHO AV AREA PEAK VELOCITY: 2.4 CM2
ECHO AV AREA VTI: 2.8 CM2
ECHO AV AREA/BSA PEAK VELOCITY: 1.3 CM2/M2
ECHO AV AREA/BSA VTI: 1.6 CM2/M2
ECHO AV CUSP MM: 2.2 CM
ECHO AV MEAN GRADIENT: 2 MMHG
ECHO AV MEAN VELOCITY: 0.7 M/S
ECHO AV PEAK GRADIENT: 4 MMHG
ECHO AV PEAK VELOCITY: 1 M/S
ECHO AV VELOCITY RATIO: 0.7
ECHO AV VTI: 17.8 CM
ECHO BSA: 1.81 M2
ECHO EST RA PRESSURE: 3 MMHG
ECHO LA DIAMETER INDEX: 1.85 CM/M2
ECHO LA DIAMETER: 3.3 CM
ECHO LA VOL A-L A2C: 74 ML (ref 22–52)
ECHO LA VOL A-L A4C: 47 ML (ref 22–52)
ECHO LA VOL MOD A2C: 71 ML (ref 22–52)
ECHO LA VOL MOD A4C: 42 ML (ref 22–52)
ECHO LA VOLUME AREA LENGTH: 67 ML
ECHO LA VOLUME INDEX A-L A2C: 42 ML/M2 (ref 16–34)
ECHO LA VOLUME INDEX A-L A4C: 26 ML/M2 (ref 16–34)
ECHO LA VOLUME INDEX AREA LENGTH: 38 ML/M2 (ref 16–34)
ECHO LA VOLUME INDEX MOD A2C: 40 ML/M2 (ref 16–34)
ECHO LA VOLUME INDEX MOD A4C: 24 ML/M2 (ref 16–34)
ECHO LV EJECTION FRACTION 3D: 48 %
ECHO LV EJECTION FRACTION A4C: 45 %
ECHO LV FRACTIONAL SHORTENING: 23 % (ref 28–44)
ECHO LV INTERNAL DIMENSION DIASTOLE INDEX: 2.25 CM/M2
ECHO LV INTERNAL DIMENSION DIASTOLIC: 4 CM (ref 3.9–5.3)
ECHO LV INTERNAL DIMENSION SYSTOLIC INDEX: 1.74 CM/M2
ECHO LV INTERNAL DIMENSION SYSTOLIC: 3.1 CM
ECHO LV ISOVOLUMETRIC RELAXATION TIME (IVRT): 103.8 MS
ECHO LV IVSD: 0.9 CM (ref 0.6–0.9)
ECHO LV IVSS: 1.3 CM
ECHO LV MASS 2D: 109.7 G (ref 67–162)
ECHO LV MASS INDEX 2D: 61.6 G/M2 (ref 43–95)
ECHO LV POSTERIOR WALL DIASTOLIC: 0.9 CM (ref 0.6–0.9)
ECHO LV POSTERIOR WALL SYSTOLIC: 1.2 CM
ECHO LV RELATIVE WALL THICKNESS RATIO: 0.45
ECHO LVOT AREA: 3.1 CM2
ECHO LVOT AV VTI INDEX: 0.87
ECHO LVOT DIAM: 2 CM
ECHO LVOT MEAN GRADIENT: 1 MMHG
ECHO LVOT PEAK GRADIENT: 2 MMHG
ECHO LVOT PEAK VELOCITY: 0.7 M/S
ECHO LVOT STROKE VOLUME INDEX: 27.3 ML/M2
ECHO LVOT SV: 48.7 ML
ECHO LVOT VTI: 15.5 CM
ECHO MV "A" WAVE DURATION: 148.8 MSEC
ECHO MV A VELOCITY: 0.6 M/S
ECHO MV AREA PHT: 3.5 CM2
ECHO MV AREA VTI: 2.9 CM2
ECHO MV E DECELERATION TIME (DT): 260.9 MS
ECHO MV E VELOCITY: 0.49 M/S
ECHO MV E/A RATIO: 0.82
ECHO MV LVOT VTI INDEX: 1.07
ECHO MV MAX VELOCITY: 0.8 M/S
ECHO MV MEAN GRADIENT: 1 MMHG
ECHO MV MEAN VELOCITY: 0.4 M/S
ECHO MV PEAK GRADIENT: 2 MMHG
ECHO MV PRESSURE HALF TIME (PHT): 62.6 MS
ECHO MV VTI: 16.6 CM
ECHO PV MAX VELOCITY: 0.6 M/S
ECHO PV MEAN GRADIENT: 1 MMHG
ECHO PV MEAN VELOCITY: 0.5 M/S
ECHO PV PEAK GRADIENT: 1 MMHG
ECHO PV VTI: 11.9 CM
ECHO PVEIN A DURATION: 103.8 MS
ECHO PVEIN A VELOCITY: 0.3 M/S
ECHO PVEIN PEAK D VELOCITY: 0.3 M/S
ECHO PVEIN PEAK S VELOCITY: 0.6 M/S
ECHO PVEIN S/D RATIO: 2
ECHO RIGHT VENTRICULAR SYSTOLIC PRESSURE (RVSP): 23 MMHG
ECHO RV INTERNAL DIMENSION: 3.3 CM
ECHO TV REGURGITANT MAX VELOCITY: 2.26 M/S
ECHO TV REGURGITANT PEAK GRADIENT: 20 MMHG

## (undated) DEVICE — TRAY SURG CUST EPIDURAL SEHC

## (undated) DEVICE — PAD, DEFIB, ADULT, RADIOTRAN, PHYSIO, LO: Brand: MEDLINE